# Patient Record
Sex: FEMALE | Race: WHITE | NOT HISPANIC OR LATINO | Employment: OTHER | URBAN - NONMETROPOLITAN AREA
[De-identification: names, ages, dates, MRNs, and addresses within clinical notes are randomized per-mention and may not be internally consistent; named-entity substitution may affect disease eponyms.]

---

## 2023-06-19 PROBLEM — E04.2 MULTINODULAR GOITER: Status: ACTIVE | Noted: 2023-06-19

## 2023-06-19 PROBLEM — G93.5 CHIARI MALFORMATION TYPE I (MULTI): Status: ACTIVE | Noted: 2023-06-19

## 2023-06-19 PROBLEM — K21.9 GERD (GASTROESOPHAGEAL REFLUX DISEASE): Status: ACTIVE | Noted: 2023-06-19

## 2023-06-19 PROBLEM — F41.8 DEPRESSION WITH ANXIETY: Status: ACTIVE | Noted: 2023-06-19

## 2023-06-19 PROBLEM — M85.80 OSTEOPENIA: Status: ACTIVE | Noted: 2023-06-19

## 2023-06-19 NOTE — PROGRESS NOTES
Subjective   Patient ID:   Melody Georges is a 52 y.o. female who presents for No chief complaint on file..  HPI  GERD:  Taking Protonix.    Depression/anxiety:  Taking Lexapro.  Had been on Celexa and Wellbutrin in the past.  Denies SI/HI.  Seeing therapy.    Osteopenia:  Taking Calcium and Vitamin D.    Thyroid nodules/Thyroid goiter:  Seeing endocrine.    Chiari malformation type I:  Asymptomatic.  This is stable.    Health maintenance:  Smoking: Never a smoker.  Mammogram (40-75): July 2022. DUE soon  Labs: Jan 2023.  Colonoscopy (50-75): May 2016  Influenza:     Review of Systems  12 point review of systems negative unless stated above in HPI    There were no vitals filed for this visit.    Physical Exam  General: Alert and oriented, well nourished, no acute distress.  Lungs: Clear to auscultation, non-labored respiration.  Heart: Normal rate, regular rhythm, no murmur, gallop or edema.  Neurologic: Awake, alert, and oriented X3, CN II-XII intact.  Psychiatric: Cooperative, appropriate mood and affect.    Assessment/Plan   Diagnoses and all orders for this visit:  Chiari malformation type I (CMS/HCC)  Depression with anxiety  Gastroesophageal reflux disease without esophagitis  Multinodular goiter  Osteopenia, unspecified location  Visit for screening mammogram

## 2023-06-27 ENCOUNTER — APPOINTMENT (OUTPATIENT)
Dept: PRIMARY CARE | Facility: CLINIC | Age: 53
End: 2023-06-27
Payer: MEDICAID

## 2023-08-04 NOTE — PROGRESS NOTES
Subjective   Patient ID:   Melody Georges is a 53 y.o. female who presents for No chief complaint on file..  HPI  GERD:  Taking Protonix.    Depression/anxiety:  Taking Lexapro.  Had been on Celexa and Wellbutrin in the past.  Denies SI/HI.  Seeing therapy.    Osteopenia:  Taking Calcium and Vitamin D.    Thyroid nodules/Thyroid goiter:  Seeing endocrine.    Chiari malformation type I:  Asymptomatic.  This is stable.    Health maintenance:  Smoking: Never a smoker.  Mammogram (40-75): July 2022. DUE  Labs: Jan 2023.  Colonoscopy (50-75): May 2016  Influenza:     Review of Systems  12 point review of systems negative unless stated above in HPI    There were no vitals filed for this visit.    Physical Exam  General: Alert and oriented, well nourished, no acute distress.  Lungs: Clear to auscultation, non-labored respiration.  Heart: Normal rate, regular rhythm, no murmur, gallop or edema.  Neurologic: Awake, alert, and oriented X3, CN II-XII intact.  Psychiatric: Cooperative, appropriate mood and affect.    Assessment/Plan   Diagnoses and all orders for this visit:  Routine general medical examination at a health care facility  Visit for screening mammogram  Chiari malformation type I (CMS/HCC)  Depression with anxiety  Gastroesophageal reflux disease without esophagitis  Multinodular goiter  Osteopenia, unspecified location

## 2023-08-10 ENCOUNTER — APPOINTMENT (OUTPATIENT)
Dept: PRIMARY CARE | Facility: CLINIC | Age: 53
End: 2023-08-10
Payer: MEDICAID

## 2023-08-14 ENCOUNTER — APPOINTMENT (OUTPATIENT)
Dept: PRIMARY CARE | Facility: CLINIC | Age: 53
End: 2023-08-14
Payer: MEDICAID

## 2023-08-23 ENCOUNTER — OFFICE VISIT (OUTPATIENT)
Dept: PRIMARY CARE | Facility: CLINIC | Age: 53
End: 2023-08-23
Payer: MEDICAID

## 2023-08-23 ENCOUNTER — LAB (OUTPATIENT)
Dept: LAB | Facility: LAB | Age: 53
End: 2023-08-23
Payer: MEDICAID

## 2023-08-23 VITALS
SYSTOLIC BLOOD PRESSURE: 106 MMHG | BODY MASS INDEX: 30.83 KG/M2 | DIASTOLIC BLOOD PRESSURE: 66 MMHG | OXYGEN SATURATION: 96 % | RESPIRATION RATE: 18 BRPM | WEIGHT: 180.6 LBS | HEIGHT: 64 IN | HEART RATE: 61 BPM

## 2023-08-23 DIAGNOSIS — Z12.31 ENCOUNTER FOR SCREENING MAMMOGRAM FOR MALIGNANT NEOPLASM OF BREAST: ICD-10-CM

## 2023-08-23 DIAGNOSIS — M85.80 OSTEOPENIA, UNSPECIFIED LOCATION: ICD-10-CM

## 2023-08-23 DIAGNOSIS — R63.5 WEIGHT GAIN: ICD-10-CM

## 2023-08-23 DIAGNOSIS — I83.93 VARICOSE VEINS OF BOTH LOWER EXTREMITIES, UNSPECIFIED WHETHER COMPLICATED: ICD-10-CM

## 2023-08-23 DIAGNOSIS — F32.A DEPRESSION, UNSPECIFIED DEPRESSION TYPE: ICD-10-CM

## 2023-08-23 DIAGNOSIS — Z12.11 COLON CANCER SCREENING: ICD-10-CM

## 2023-08-23 DIAGNOSIS — R53.83 OTHER FATIGUE: ICD-10-CM

## 2023-08-23 DIAGNOSIS — K21.9 GASTROESOPHAGEAL REFLUX DISEASE WITHOUT ESOPHAGITIS: ICD-10-CM

## 2023-08-23 PROBLEM — K59.00 CONSTIPATION: Status: ACTIVE | Noted: 2023-08-23

## 2023-08-23 PROBLEM — U07.1 COVID-19: Status: RESOLVED | Noted: 2023-08-23 | Resolved: 2023-08-23

## 2023-08-23 PROBLEM — E78.5 HYPERLIPIDEMIA: Status: ACTIVE | Noted: 2023-08-23

## 2023-08-23 PROBLEM — L30.9 DERMATITIS: Status: RESOLVED | Noted: 2023-08-23 | Resolved: 2023-08-23

## 2023-08-23 PROBLEM — J20.9 ACUTE BRONCHITIS: Status: RESOLVED | Noted: 2023-08-23 | Resolved: 2023-08-23

## 2023-08-23 PROBLEM — E66.9 OBESITY: Status: ACTIVE | Noted: 2023-08-23

## 2023-08-23 PROBLEM — J30.9 ALLERGIC RHINITIS: Status: RESOLVED | Noted: 2023-08-23 | Resolved: 2023-08-23

## 2023-08-23 PROBLEM — J01.00 ACUTE MAXILLARY SINUSITIS: Status: RESOLVED | Noted: 2023-08-23 | Resolved: 2023-08-23

## 2023-08-23 PROBLEM — J06.9 ACUTE URI: Status: RESOLVED | Noted: 2023-08-23 | Resolved: 2023-08-23

## 2023-08-23 PROBLEM — D64.9 LOW HEMOGLOBIN: Status: ACTIVE | Noted: 2023-08-23

## 2023-08-23 PROBLEM — D50.9 IRON DEFICIENCY ANEMIA: Status: ACTIVE | Noted: 2023-08-23

## 2023-08-23 PROBLEM — R19.5 OCCULT BLOOD POSITIVE STOOL: Status: RESOLVED | Noted: 2023-08-23 | Resolved: 2023-08-23

## 2023-08-23 PROBLEM — R92.8 ABNORMAL MAMMOGRAM: Status: ACTIVE | Noted: 2023-08-23

## 2023-08-23 PROBLEM — R05.9 COUGH: Status: RESOLVED | Noted: 2023-08-23 | Resolved: 2023-08-23

## 2023-08-23 PROBLEM — R06.89 HYPERCARBIA: Status: ACTIVE | Noted: 2023-08-23

## 2023-08-23 PROBLEM — L73.9 FOLLICULITIS: Status: RESOLVED | Noted: 2023-08-23 | Resolved: 2023-08-23

## 2023-08-23 PROBLEM — N63.0 MASS OF BREAST: Status: ACTIVE | Noted: 2023-08-23

## 2023-08-23 PROBLEM — J02.9 ACUTE VIRAL PHARYNGITIS: Status: RESOLVED | Noted: 2023-08-23 | Resolved: 2023-08-23

## 2023-08-23 PROBLEM — J01.20 ACUTE ETHMOIDAL SINUSITIS: Status: RESOLVED | Noted: 2023-08-23 | Resolved: 2023-08-23

## 2023-08-23 PROBLEM — J02.9 SORE THROAT: Status: RESOLVED | Noted: 2023-08-23 | Resolved: 2023-08-23

## 2023-08-23 PROBLEM — E04.9 THYROID GOITER: Status: ACTIVE | Noted: 2023-08-23

## 2023-08-23 PROBLEM — F32.9 MAJOR DEPRESSIVE DISORDER, SINGLE EPISODE, UNSPECIFIED: Status: ACTIVE | Noted: 2021-05-26

## 2023-08-23 PROBLEM — I88.9 SUBMANDIBULAR LYMPHADENITIS: Status: RESOLVED | Noted: 2023-08-23 | Resolved: 2023-08-23

## 2023-08-23 PROBLEM — M25.539 PAIN, WRIST JOINT: Status: RESOLVED | Noted: 2023-08-23 | Resolved: 2023-08-23

## 2023-08-23 PROBLEM — E67.3 HIGH VITAMIN D LEVEL: Status: ACTIVE | Noted: 2023-08-23

## 2023-08-23 LAB
CHOLESTEROL (MG/DL) IN SER/PLAS: 223 MG/DL (ref 0–199)
CHOLESTEROL IN HDL (MG/DL) IN SER/PLAS: 73.1 MG/DL
CHOLESTEROL/HDL RATIO: 3.1
FERRITIN (UG/LL) IN SER/PLAS: 188 UG/L (ref 8–150)
IRON (UG/DL) IN SER/PLAS: 159 UG/DL (ref 35–150)
IRON BINDING CAPACITY (UG/DL) IN SER/PLAS: 320 UG/DL (ref 240–445)
IRON SATURATION (%) IN SER/PLAS: 50 % (ref 25–45)
LDL: 131 MG/DL (ref 0–99)
TRIGLYCERIDE (MG/DL) IN SER/PLAS: 97 MG/DL (ref 0–149)
VLDL: 19 MG/DL (ref 0–40)

## 2023-08-23 PROCEDURE — 83540 ASSAY OF IRON: CPT

## 2023-08-23 PROCEDURE — 82728 ASSAY OF FERRITIN: CPT

## 2023-08-23 PROCEDURE — 1036F TOBACCO NON-USER: CPT | Performed by: INTERNAL MEDICINE

## 2023-08-23 PROCEDURE — 80061 LIPID PANEL: CPT

## 2023-08-23 PROCEDURE — 83550 IRON BINDING TEST: CPT

## 2023-08-23 PROCEDURE — 99214 OFFICE O/P EST MOD 30 MIN: CPT | Performed by: INTERNAL MEDICINE

## 2023-08-23 PROCEDURE — 36415 COLL VENOUS BLD VENIPUNCTURE: CPT

## 2023-08-23 RX ORDER — ESTRADIOL 1 MG/1
1 TABLET ORAL DAILY
COMMUNITY

## 2023-08-23 RX ORDER — DEXLANSOPRAZOLE 60 MG/1
1 CAPSULE, DELAYED RELEASE ORAL DAILY
COMMUNITY
End: 2023-10-12 | Stop reason: SDUPTHER

## 2023-08-23 RX ORDER — LORAZEPAM 0.5 MG/1
TABLET ORAL AS NEEDED
COMMUNITY
Start: 2023-03-07 | End: 2024-01-16 | Stop reason: SDUPTHER

## 2023-08-23 RX ORDER — DOCUSATE SODIUM 100 MG/1
1 CAPSULE, LIQUID FILLED ORAL DAILY
COMMUNITY
Start: 2017-01-20

## 2023-08-23 RX ORDER — BUPROPION HYDROCHLORIDE 150 MG/1
150 TABLET ORAL EVERY MORNING
Qty: 30 TABLET | Refills: 1 | Status: SHIPPED | OUTPATIENT
Start: 2023-08-23 | End: 2023-09-29 | Stop reason: SDUPTHER

## 2023-08-23 RX ORDER — ESCITALOPRAM OXALATE 10 MG/1
10 TABLET ORAL DAILY
COMMUNITY
End: 2023-09-29 | Stop reason: ALTCHOICE

## 2023-08-23 RX ORDER — CALCIUM CARBONATE 500(1250)
TABLET ORAL
COMMUNITY

## 2023-08-23 ASSESSMENT — ENCOUNTER SYMPTOMS: APPETITE CHANGE: 1

## 2023-08-23 ASSESSMENT — PAIN SCALES - GENERAL: PAINLEVEL: 0-NO PAIN

## 2023-08-23 NOTE — PATIENT INSTRUCTIONS
It was great to see you in the office today! Here is what we discussed at your visit today:  We ordered a dexa scan to check for osteoporosis. Please get this completed as soon as you are able. We will call you with results.  I have ordered you a mammogram to be done as soon as you are able.  We will call the results.  I have placed a referral for you to have a colonoscopy done as soon as you are able to.  We will send in prescription for Wellbutrin XL 150mg daily   Continue to take your other medications including Lexapro  Call office after 3 weeks to let us know how you are tolerating new medication and we will discuss weaning off Lexapro  Please get bloodwork drawn as soon as you are able. We will call you with results.  Follow up in four months

## 2023-08-23 NOTE — PROGRESS NOTES
SUBJECTIVE: She states that she would like to have her cholesterol checked, has not been eating very healthy, recently put on ten pounds.   She states that she was told Lexapro can cause weight gain, and she tried to stop the med, but she was very weepy. She states that she feels constantly hungry on Lexapro. She states that she was previously on Celexa. She states that she likes the way she feels on Lexapro. She states she tried Wellbutrin in the past and it made her nauseated, she tried it for two weeks.     EMILIA Georges is a 53 y.o. female with PMH remarkable for GERD, Depression/Anxiety, Osteopenia, thyroid nodules who presents to the office today for a check up. She was previously seen in office on 01/31/23 for check up per CHINYERE Abel.     HEALTH MAINTENANCE: FOLLOW UP  Smoking: never smoker  Mammogram (40-75): 7/7/22-DUE  Pap smear (21-65): per OB/GYN  Labs: 1/31/23  Colonoscopy (45-75): 5/23/16  Lung cancer screening (55-80 + 30 pack year + smoking/quit in last 15 years): n/a  DEXA (65+, q 2 years): 10/03/18 revealed osteopenia-->DUE    SOCIAL HISTORY:  Social History     Socioeconomic History    Marital status:      Spouse name: Not on file    Number of children: Not on file    Years of education: Not on file    Highest education level: Not on file   Occupational History    Not on file   Tobacco Use    Smoking status: Former     Types: Cigarettes    Smokeless tobacco: Never   Substance and Sexual Activity    Alcohol use: Not Currently    Drug use: Not on file    Sexual activity: Not on file   Other Topics Concern    Not on file   Social History Narrative    Not on file     Social Determinants of Health     Financial Resource Strain: Not on file   Food Insecurity: Not on file   Transportation Needs: Not on file   Physical Activity: Not on file   Stress: Not on file   Social Connections: Not on file   Intimate Partner Violence: Not on file   Housing Stability: Not on file  "    IMMUNIZATIONS:  Immunization History   Administered Date(s) Administered    Pfizer Purple Cap SARS-CoV-2 08/11/2021, 09/03/2021     REVIEW OF SYSTEMS:  Review of Systems   Constitutional:  Positive for appetite change.        Increased appetite with weight gain   Psychiatric/Behavioral:          + anxiety/depression     ALLERGIES:  Allergies   Allergen Reactions    Bupropion Hcl Other    Morphine Hives    Penicillins Hives    Sulfa (Sulfonamide Antibiotics) Hives     VITAL SIGNS:  Vitals:    08/23/23 0914   BP: 106/66   Pulse: 61   Resp: 18   SpO2: 96%   Weight: 81.9 kg (180 lb 9.6 oz)   Height: 1.626 m (5' 4\")     PHYSICAL EXAM:  Physical Exam  Vitals reviewed.   Constitutional:       Appearance: Normal appearance.   HENT:      Head: Normocephalic and atraumatic.   Eyes:      Extraocular Movements: Extraocular movements intact.      Pupils: Pupils are equal, round, and reactive to light.   Cardiovascular:      Rate and Rhythm: Normal rate and regular rhythm.      Pulses: Normal pulses.      Heart sounds: Normal heart sounds.   Pulmonary:      Effort: Pulmonary effort is normal.      Breath sounds: Normal breath sounds.   Abdominal:      General: Bowel sounds are normal.      Palpations: Abdomen is soft.      Hernia: No hernia is present.   Musculoskeletal:         General: Normal range of motion.      Cervical back: Normal range of motion and neck supple.   Skin:     General: Skin is warm and dry.   Neurological:      General: No focal deficit present.      Mental Status: She is alert and oriented to person, place, and time.   Psychiatric:         Mood and Affect: Mood normal.         Behavior: Behavior normal.       MEDICATIONS:  Current Outpatient Medications on File Prior to Visit   Medication Sig    calcium carbonate (Oscal) 500 mg calcium (1,250 mg) tablet as directed Orally chewable    Dexilant 60 mg DR capsule Take 1 capsule (60 mg) by mouth once daily.    docusate sodium (Colace) 100 mg capsule Take 1 " capsule (100 mg) by mouth once daily.    escitalopram (Lexapro) 10 mg tablet Take 1 tablet (10 mg) by mouth once daily.    estradiol (Estrace) 1 mg tablet Take 1 tablet (1 mg) by mouth once daily.    LORazepam (Ativan) 0.5 mg tablet if needed.     No current facility-administered medications on file prior to visit.     LABORATORY DATA:  Lab Results   Component Value Date    WBC 6.7 01/31/2023    HGB 12.7 01/31/2023    HCT 40.4 01/31/2023     01/31/2023    CHOL 199 01/31/2023    TRIG 127 01/31/2023    HDL 64.0 01/31/2023    ALT 15 01/31/2023    AST 17 01/31/2023     01/31/2023    K 4.0 01/31/2023     01/31/2023    CREATININE 0.67 01/31/2023    BUN 13 01/31/2023    CO2 29 01/31/2023    TSH 1.26 01/31/2023     ASSESSMENT AND PLAN:  Health Maintenance: check up  - reviewed most recent bloodwork from 01/31/23  - most recent mammogram on 7/7/23-->DUE, order inputted  - dexa scan from 10/03/18 revealed osteopenia, will repeat test, order inputted  - she states she has been eating a lot, unhealthy choices, has had weight gain, read that it could be caused from Lexapro  - she is concerned about her cholesterol due to weight gain and poor diet  - will check lipid panel, iron panel and ferritin, orders inputted  - she is due for colonoscopy, order inputted    Depression/Anxiety  - she states that she has gained weight, has been overeating  - she reports she read that Lexapro can cause increased appetite and weight gain and tried to wean herself off, but became very tearful and anxious so she resumed full dose  - discussed at length different antidepressant medications and associated side effects with patient  - she states she tried Wellbutrin in the past and it caused nausea, only took it for two weeks  - she is agreeable to try this medication again  - will start Wellbutrin XL 150mg daily, advised to call office after three weeks to inform us if she is able to tolerate medication  - if she is able to  tolerate Wellbutrin, will consider weaning off Lexapro in the future  Thyroid nodules    GERD  - stable  - continue with PPI    Osteopenia  - dexa scan in 2018 revealed osteopenia  - will repeat test, order inputted  - continue with Calcium and vitamin D supplement    Varicose veins  - she reports significant improvement since having procedure per vein specialist  -no edema noted    --------------------  Written by Maria Elena Tracy LPN, acting as a scribe for Dr. Archer. This note accurately reflects the work and decisions made by Dr. Archer.     I, Dr. Archer, attest all medical record entries made by the scribe were under my direction and were personally dictated by me. I have reviewed the chart and agree that the record accurately reflects my performance of the history, physical exam, and assessment and plan.

## 2023-09-11 ENCOUNTER — TELEPHONE (OUTPATIENT)
Dept: PRIMARY CARE | Facility: CLINIC | Age: 53
End: 2023-09-11
Payer: MEDICAID

## 2023-09-12 ENCOUNTER — TELEPHONE (OUTPATIENT)
Dept: PRIMARY CARE | Facility: CLINIC | Age: 53
End: 2023-09-12
Payer: MEDICAID

## 2023-09-12 NOTE — TELEPHONE ENCOUNTER
Pt would like to stop lexapro   Discussed with Dr. Archer   -decrease lexapro to 5mg daily x 1 week  -one tablet every other day x 1 week   -then discontinue    Pt informed

## 2023-09-29 DIAGNOSIS — F32.A DEPRESSION, UNSPECIFIED DEPRESSION TYPE: ICD-10-CM

## 2023-09-29 RX ORDER — BUPROPION HYDROCHLORIDE 150 MG/1
150 TABLET ORAL EVERY MORNING
Qty: 90 TABLET | Refills: 0 | Status: SHIPPED | OUTPATIENT
Start: 2023-09-29 | End: 2023-10-16 | Stop reason: DRUGHIGH

## 2023-10-12 DIAGNOSIS — K21.9 GASTROESOPHAGEAL REFLUX DISEASE WITHOUT ESOPHAGITIS: Primary | ICD-10-CM

## 2023-10-13 RX ORDER — DEXLANSOPRAZOLE 60 MG/1
1 CAPSULE, DELAYED RELEASE ORAL DAILY
Qty: 90 CAPSULE | Refills: 1 | Status: SHIPPED | OUTPATIENT
Start: 2023-10-13 | End: 2024-01-16 | Stop reason: SDUPTHER

## 2023-10-16 DIAGNOSIS — F32.A DEPRESSION, UNSPECIFIED DEPRESSION TYPE: ICD-10-CM

## 2023-10-16 RX ORDER — BUPROPION HYDROCHLORIDE 100 MG/1
100 TABLET ORAL 2 TIMES DAILY
COMMUNITY
End: 2023-10-17 | Stop reason: SDUPTHER

## 2023-10-16 RX ORDER — BUPROPION HYDROCHLORIDE 100 MG/1
100 TABLET ORAL 2 TIMES DAILY
Qty: 60 TABLET | Refills: 0 | Status: CANCELLED | OUTPATIENT
Start: 2023-10-16

## 2023-10-16 RX ORDER — ESCITALOPRAM OXALATE 5 MG/1
5 TABLET ORAL DAILY
Qty: 30 TABLET | Refills: 2 | Status: SHIPPED | OUTPATIENT
Start: 2023-10-16 | End: 2024-03-13 | Stop reason: SDUPTHER

## 2023-10-16 NOTE — TELEPHONE ENCOUNTER
Pt stated lexapro helped with anxiety and depression but caused her to overeat, wellbutrin not helping and celexa has not helped either in the past  Pt stated she restarted the lexapro 5mg due to issues over the weekend, wants your opinion on if she should restart lexapro for good, stop wellbutrin or try a different medication all together ?

## 2023-10-17 DIAGNOSIS — F32.A DEPRESSION, UNSPECIFIED DEPRESSION TYPE: ICD-10-CM

## 2023-10-17 RX ORDER — BUPROPION HYDROCHLORIDE 150 MG/1
150 TABLET ORAL
Qty: 30 TABLET | Refills: 1 | Status: CANCELLED | OUTPATIENT
Start: 2023-10-17

## 2023-10-17 RX ORDER — BUPROPION HYDROCHLORIDE 100 MG/1
100 TABLET ORAL 2 TIMES DAILY
Qty: 60 TABLET | Refills: 0 | Status: SHIPPED | OUTPATIENT
Start: 2023-10-17

## 2023-11-06 ENCOUNTER — APPOINTMENT (OUTPATIENT)
Dept: PREADMISSION TESTING | Facility: HOSPITAL | Age: 53
End: 2023-11-06
Payer: MEDICAID

## 2023-12-12 ENCOUNTER — APPOINTMENT (OUTPATIENT)
Dept: PRIMARY CARE | Facility: CLINIC | Age: 53
End: 2023-12-12
Payer: MEDICAID

## 2024-01-03 ENCOUNTER — APPOINTMENT (OUTPATIENT)
Dept: PRIMARY CARE | Facility: CLINIC | Age: 54
End: 2024-01-03
Payer: MEDICAID

## 2024-01-16 ENCOUNTER — OFFICE VISIT (OUTPATIENT)
Dept: PRIMARY CARE | Facility: CLINIC | Age: 54
End: 2024-01-16
Payer: MEDICAID

## 2024-01-16 ENCOUNTER — LAB (OUTPATIENT)
Dept: LAB | Facility: LAB | Age: 54
End: 2024-01-16
Payer: MEDICAID

## 2024-01-16 VITALS
TEMPERATURE: 97.7 F | HEIGHT: 64 IN | HEART RATE: 60 BPM | SYSTOLIC BLOOD PRESSURE: 118 MMHG | BODY MASS INDEX: 31.51 KG/M2 | DIASTOLIC BLOOD PRESSURE: 60 MMHG | WEIGHT: 184.6 LBS | OXYGEN SATURATION: 97 %

## 2024-01-16 DIAGNOSIS — E78.2 MIXED HYPERLIPIDEMIA: ICD-10-CM

## 2024-01-16 DIAGNOSIS — E66.09 CLASS 1 OBESITY DUE TO EXCESS CALORIES WITH SERIOUS COMORBIDITY AND BODY MASS INDEX (BMI) OF 31.0 TO 31.9 IN ADULT: ICD-10-CM

## 2024-01-16 DIAGNOSIS — F41.9 ANXIETY: Primary | ICD-10-CM

## 2024-01-16 DIAGNOSIS — K21.9 GASTROESOPHAGEAL REFLUX DISEASE WITHOUT ESOPHAGITIS: ICD-10-CM

## 2024-01-16 LAB
ALBUMIN SERPL BCP-MCNC: 4 G/DL (ref 3.4–5)
ALP SERPL-CCNC: 61 U/L (ref 33–110)
ALT SERPL W P-5'-P-CCNC: 13 U/L (ref 7–45)
ANION GAP SERPL CALC-SCNC: 11 MMOL/L (ref 10–20)
AST SERPL W P-5'-P-CCNC: 16 U/L (ref 9–39)
BILIRUB SERPL-MCNC: 0.7 MG/DL (ref 0–1.2)
BUN SERPL-MCNC: 17 MG/DL (ref 6–23)
CALCIUM SERPL-MCNC: 9.5 MG/DL (ref 8.6–10.3)
CHLORIDE SERPL-SCNC: 103 MMOL/L (ref 98–107)
CHOLEST SERPL-MCNC: 202 MG/DL (ref 0–199)
CHOLESTEROL/HDL RATIO: 2.9
CO2 SERPL-SCNC: 30 MMOL/L (ref 21–32)
CREAT SERPL-MCNC: 0.74 MG/DL (ref 0.5–1.05)
EGFRCR SERPLBLD CKD-EPI 2021: >90 ML/MIN/1.73M*2
GLUCOSE SERPL-MCNC: 91 MG/DL (ref 74–99)
HDLC SERPL-MCNC: 70.8 MG/DL
LDLC SERPL CALC-MCNC: 112 MG/DL
NON HDL CHOLESTEROL: 131 MG/DL (ref 0–149)
POTASSIUM SERPL-SCNC: 4.3 MMOL/L (ref 3.5–5.3)
PROT SERPL-MCNC: 6.5 G/DL (ref 6.4–8.2)
SODIUM SERPL-SCNC: 140 MMOL/L (ref 136–145)
TRIGL SERPL-MCNC: 97 MG/DL (ref 0–149)
VLDL: 19 MG/DL (ref 0–40)

## 2024-01-16 PROCEDURE — 99214 OFFICE O/P EST MOD 30 MIN: CPT | Performed by: FAMILY MEDICINE

## 2024-01-16 PROCEDURE — 3008F BODY MASS INDEX DOCD: CPT | Performed by: FAMILY MEDICINE

## 2024-01-16 PROCEDURE — 1036F TOBACCO NON-USER: CPT | Performed by: FAMILY MEDICINE

## 2024-01-16 PROCEDURE — 36415 COLL VENOUS BLD VENIPUNCTURE: CPT

## 2024-01-16 RX ORDER — IBUPROFEN 100 MG/5ML
1000 SUSPENSION, ORAL (FINAL DOSE FORM) ORAL DAILY
COMMUNITY

## 2024-01-16 RX ORDER — LORAZEPAM 0.5 MG/1
0.5 TABLET ORAL DAILY PRN
Qty: 30 TABLET | Refills: 1 | Status: SHIPPED | OUTPATIENT
Start: 2024-01-16

## 2024-01-16 RX ORDER — DEXLANSOPRAZOLE 60 MG/1
1 CAPSULE, DELAYED RELEASE ORAL DAILY PRN
Qty: 90 CAPSULE | Refills: 1 | Status: SHIPPED | OUTPATIENT
Start: 2024-01-16 | End: 2024-02-12 | Stop reason: SDUPTHER

## 2024-01-16 RX ORDER — ONDANSETRON 4 MG/1
4 TABLET, ORALLY DISINTEGRATING ORAL 3 TIMES DAILY
COMMUNITY
Start: 2023-10-18

## 2024-01-16 RX ORDER — CA/D3/MAG OX/ZINC/COP/MANG/BOR 600 MG-800
1 TABLET,CHEWABLE ORAL DAILY
COMMUNITY
Start: 2014-04-10

## 2024-01-16 ASSESSMENT — ENCOUNTER SYMPTOMS
DIZZINESS: 0
NAUSEA: 0
FEVER: 0
ENDOCRINE NEGATIVE: 1
DIARRHEA: 0
SHORTNESS OF BREATH: 0
DIFFICULTY URINATING: 0

## 2024-01-16 ASSESSMENT — PAIN SCALES - GENERAL: PAINLEVEL: 5

## 2024-01-16 ASSESSMENT — PATIENT HEALTH QUESTIONNAIRE - PHQ9
SUM OF ALL RESPONSES TO PHQ9 QUESTIONS 1 AND 2: 0
1. LITTLE INTEREST OR PLEASURE IN DOING THINGS: NOT AT ALL
2. FEELING DOWN, DEPRESSED OR HOPELESS: NOT AT ALL

## 2024-01-16 NOTE — PROGRESS NOTES
"Subjective   Patient ID: Melody Georges is a 53 y.o. female who presents for Obesity (Discuss starting adipex) and Hyperlipidemia (Discuss lab order for lipids).    Obesity,weight gain  GERD-heartburn  High Cholesterol  Chronic anxiety       Review of Systems   Constitutional:  Negative for fever.        Also see HPI   Eyes:  Negative for visual disturbance.   Respiratory:  Negative for shortness of breath.    Cardiovascular:  Negative for chest pain.   Gastrointestinal:  Negative for diarrhea and nausea.   Endocrine: Negative.    Genitourinary:  Negative for difficulty urinating.   Skin:  Negative for rash.   Neurological:  Negative for dizziness.        No focal deficits   Psychiatric/Behavioral:  Negative for suicidal ideas.    All other systems reviewed and are negative.      Objective   /60   Pulse 60   Temp 36.5 °C (97.7 °F)   Ht 1.626 m (5' 4\")   Wt 83.7 kg (184 lb 9.6 oz)   LMP  (LMP Unknown)   SpO2 97%   BMI 31.69 kg/m²     Physical Exam  Vitals and nursing note reviewed.   Constitutional:       Appearance: Normal appearance.   HENT:      Head: Normocephalic and atraumatic.   Eyes:      Extraocular Movements: Extraocular movements intact.      Conjunctiva/sclera: Conjunctivae normal.   Cardiovascular:      Rate and Rhythm: Normal rate and regular rhythm.      Heart sounds: Normal heart sounds.   Pulmonary:      Effort: Pulmonary effort is normal.      Breath sounds: Normal breath sounds.      Comments: Lungs essentially CTA b/l  Abdominal:      General: There is no distension.      Palpations: Abdomen is soft. There is no mass.      Tenderness: There is no abdominal tenderness.   Musculoskeletal:      Right lower leg: No edema.      Left lower leg: No edema.   Skin:     Coloration: Skin is not jaundiced.      Findings: No rash.   Neurological:      General: No focal deficit present.      Mental Status: She is alert and oriented to person, place, and time.   Psychiatric:         Mood and " Affect: Mood normal.         Behavior: Behavior normal.         Thought Content: Thought content normal.         Judgment: Judgment normal.       Assessment/Plan   Diagnoses and all orders for this visit:  Anxiety  -     LORazepam (Ativan) 0.5 mg tablet; Take 1 tablet (0.5 mg) by mouth once daily as needed for anxiety.  Gastroesophageal reflux disease without esophagitis  -     Dexilant 60 mg DR capsule; Take 1 capsule (60 mg) by mouth once daily as needed (Heartburn).  Class 1 obesity due to excess calories with serious comorbidity and body mass index (BMI) of 31.0 to 31.9 in adult  -     phentermine (Adipex-P) 37.5 mg tablet; Take 1 tablet (37.5 mg) by mouth once daily in the morning. Take before meals.  Mixed hyperlipidemia  -     Lipid Panel; Future  -     Comprehensive Metabolic Panel; Future

## 2024-01-17 RX ORDER — PHENTERMINE HYDROCHLORIDE 37.5 MG/1
37.5 TABLET ORAL
Qty: 30 TABLET | Refills: 0 | Status: SHIPPED | OUTPATIENT
Start: 2024-01-17 | End: 2024-02-16 | Stop reason: SDUPTHER

## 2024-01-29 ENCOUNTER — TELEMEDICINE (OUTPATIENT)
Dept: PRIMARY CARE | Facility: CLINIC | Age: 54
End: 2024-01-29
Payer: MEDICAID

## 2024-01-29 DIAGNOSIS — J01.00 ACUTE NON-RECURRENT MAXILLARY SINUSITIS: Primary | ICD-10-CM

## 2024-01-29 PROCEDURE — 99213 OFFICE O/P EST LOW 20 MIN: CPT | Performed by: FAMILY MEDICINE

## 2024-01-29 RX ORDER — AZITHROMYCIN 500 MG/1
500 TABLET, FILM COATED ORAL DAILY
Qty: 5 TABLET | Refills: 0 | Status: SHIPPED | OUTPATIENT
Start: 2024-01-29 | End: 2024-02-03

## 2024-01-29 ASSESSMENT — ENCOUNTER SYMPTOMS
TROUBLE SWALLOWING: 0
SHORTNESS OF BREATH: 0
ABDOMINAL PAIN: 0
STRIDOR: 0
SWOLLEN GLANDS: 0
DIARRHEA: 0
VOMITING: 0
HOARSE VOICE: 1
COUGH: 1
SORE THROAT: 1
HEADACHES: 1
NECK PAIN: 0

## 2024-01-29 NOTE — PROGRESS NOTES
Subjective   Patient ID: Melody Georges is a 53 y.o. female who presents for No chief complaint on file..    Telemedicine visit audio and video    Sore Throat   The maximum temperature recorded prior to her arrival was 100 - 100.9 F. The fever has been present for Less than 1 day. The pain is at a severity of 4/10. Associated symptoms include congestion, coughing, ear pain, headaches, a hoarse voice and a plugged ear sensation. Pertinent negatives include no abdominal pain, diarrhea, drooling, ear discharge, neck pain, shortness of breath, stridor, swollen glands, trouble swallowing or vomiting. She has had no exposure to strep or mono.        Review of Systems   HENT:  Positive for congestion, ear pain, hoarse voice and sore throat. Negative for drooling, ear discharge and trouble swallowing.    Respiratory:  Positive for cough. Negative for shortness of breath and stridor.    Gastrointestinal:  Negative for abdominal pain, diarrhea and vomiting.   Musculoskeletal:  Negative for neck pain.   Neurological:  Positive for headaches.       Objective   LMP  (LMP Unknown)     Physical Exam  Constitutional:       Appearance: Normal appearance.   HENT:      Head: Normocephalic and atraumatic.   Pulmonary:      Effort: Pulmonary effort is normal.   Neurological:      Mental Status: She is alert and oriented to person, place, and time.   Psychiatric:         Mood and Affect: Mood normal.         Behavior: Behavior normal.         Assessment/Plan   Diagnoses and all orders for this visit:  Acute non-recurrent maxillary sinusitis  -     azithromycin (Zithromax) 500 mg tablet; Take 1 tablet (500 mg) by mouth once daily for 5 days.

## 2024-01-30 ENCOUNTER — APPOINTMENT (OUTPATIENT)
Dept: PREADMISSION TESTING | Facility: HOSPITAL | Age: 54
End: 2024-01-30
Payer: MEDICAID

## 2024-02-12 DIAGNOSIS — K21.9 GASTROESOPHAGEAL REFLUX DISEASE WITHOUT ESOPHAGITIS: ICD-10-CM

## 2024-02-12 NOTE — TELEPHONE ENCOUNTER
Refill request patient has switch pharm from [facility] to [facility] and now patient will need letter as why dexilant is preferred med. The is med is no longer on patient formulary patient states she has tried all other meds and this one is the only one that works.

## 2024-02-13 RX ORDER — DEXLANSOPRAZOLE 60 MG/1
1 CAPSULE, DELAYED RELEASE ORAL DAILY PRN
Qty: 90 CAPSULE | Refills: 1 | Status: SHIPPED | OUTPATIENT
Start: 2024-02-13

## 2024-02-16 ENCOUNTER — TELEMEDICINE (OUTPATIENT)
Dept: PRIMARY CARE | Facility: CLINIC | Age: 54
End: 2024-02-16
Payer: MEDICAID

## 2024-02-16 VITALS
BODY MASS INDEX: 31.89 KG/M2 | HEIGHT: 63 IN | DIASTOLIC BLOOD PRESSURE: 62 MMHG | SYSTOLIC BLOOD PRESSURE: 96 MMHG | WEIGHT: 180 LBS | HEART RATE: 65 BPM

## 2024-02-16 DIAGNOSIS — E66.09 CLASS 1 OBESITY DUE TO EXCESS CALORIES WITH SERIOUS COMORBIDITY AND BODY MASS INDEX (BMI) OF 31.0 TO 31.9 IN ADULT: ICD-10-CM

## 2024-02-16 DIAGNOSIS — K21.9 GASTROESOPHAGEAL REFLUX DISEASE WITHOUT ESOPHAGITIS: Primary | ICD-10-CM

## 2024-02-16 PROCEDURE — 3008F BODY MASS INDEX DOCD: CPT | Performed by: FAMILY MEDICINE

## 2024-02-16 PROCEDURE — 99213 OFFICE O/P EST LOW 20 MIN: CPT | Performed by: FAMILY MEDICINE

## 2024-02-16 PROCEDURE — 1036F TOBACCO NON-USER: CPT | Performed by: FAMILY MEDICINE

## 2024-02-16 RX ORDER — PHENTERMINE HYDROCHLORIDE 37.5 MG/1
37.5 TABLET ORAL
Qty: 30 TABLET | Refills: 0 | Status: SHIPPED | OUTPATIENT
Start: 2024-02-16 | End: 2024-03-13 | Stop reason: SDUPTHER

## 2024-02-20 NOTE — PROGRESS NOTES
"Subjective   Patient ID: Melody Georges is a 53 y.o. female who presents for No chief complaint on file..    Telemedicine visit audio and video   gastroesophageal reflux disease without esophagitis  Class 1 obesity due to excess calories         Review of Systems   Constitutional:  Negative for fever.        Also see HPI   Eyes:  Negative for visual disturbance.   Respiratory:  Negative for shortness of breath.    Cardiovascular:  Negative for chest pain.   Gastrointestinal:  Negative for diarrhea and nausea.   Endocrine: Negative.    Genitourinary:  Negative for difficulty urinating.   Skin:  Negative for rash.   Neurological:  Negative for dizziness.        No focal deficits   Psychiatric/Behavioral:  Negative for suicidal ideas.    All other systems reviewed and are negative.      Objective   BP 96/62   Pulse 65   Ht 1.6 m (5' 3\")   Wt 81.6 kg (180 lb)   BMI 31.89 kg/m²     Physical Exam  Constitutional:       Appearance: Normal appearance.   HENT:      Head: Normocephalic and atraumatic.   Eyes:      Extraocular Movements: Extraocular movements intact.   Pulmonary:      Effort: Pulmonary effort is normal.   Neurological:      Mental Status: She is alert and oriented to person, place, and time.   Psychiatric:         Mood and Affect: Mood normal.         Behavior: Behavior normal.         Thought Content: Thought content normal.         Judgment: Judgment normal.         Assessment/Plan   Diagnoses and all orders for this visit:  Gastroesophageal reflux disease without esophagitis  Class 1 obesity due to excess calories with serious comorbidity and body mass index (BMI) of 31.0 to 31.9 in adult  -     phentermine (Adipex-P) 37.5 mg tablet; Take 1 tablet (37.5 mg) by mouth once daily in the morning. Take before meals.         "

## 2024-03-12 ENCOUNTER — TELEPHONE (OUTPATIENT)
Dept: PRIMARY CARE | Facility: CLINIC | Age: 54
End: 2024-03-12
Payer: MEDICAID

## 2024-03-12 DIAGNOSIS — F32.A DEPRESSION, UNSPECIFIED DEPRESSION TYPE: ICD-10-CM

## 2024-03-12 DIAGNOSIS — E66.09 CLASS 1 OBESITY DUE TO EXCESS CALORIES WITH SERIOUS COMORBIDITY AND BODY MASS INDEX (BMI) OF 30.0 TO 30.9 IN ADULT: Primary | ICD-10-CM

## 2024-03-12 DIAGNOSIS — E66.09 CLASS 1 OBESITY DUE TO EXCESS CALORIES WITH SERIOUS COMORBIDITY AND BODY MASS INDEX (BMI) OF 31.0 TO 31.9 IN ADULT: ICD-10-CM

## 2024-03-13 VITALS — WEIGHT: 172 LBS | BODY MASS INDEX: 30.48 KG/M2 | HEIGHT: 63 IN

## 2024-03-13 RX ORDER — PHENTERMINE HYDROCHLORIDE 37.5 MG/1
37.5 TABLET ORAL
Qty: 30 TABLET | Refills: 0 | Status: SHIPPED | OUTPATIENT
Start: 2024-03-13 | End: 2024-04-12

## 2024-03-13 RX ORDER — BUSPIRONE HYDROCHLORIDE 10 MG/1
10 TABLET ORAL 2 TIMES DAILY
Qty: 60 TABLET | Refills: 5 | Status: SHIPPED | OUTPATIENT
Start: 2024-03-13

## 2024-03-13 RX ORDER — ESCITALOPRAM OXALATE 5 MG/1
5 TABLET ORAL DAILY
Qty: 30 TABLET | Refills: 2 | Status: SHIPPED | OUTPATIENT
Start: 2024-03-13 | End: 2024-03-13 | Stop reason: SDUPTHER

## 2024-03-13 RX ORDER — BUSPIRONE HYDROCHLORIDE 10 MG/1
10 TABLET ORAL 2 TIMES DAILY
Qty: 60 TABLET | Refills: 5 | Status: SHIPPED | OUTPATIENT
Start: 2024-03-13 | End: 2024-03-13 | Stop reason: SDUPTHER

## 2024-03-13 RX ORDER — ESCITALOPRAM OXALATE 5 MG/1
5 TABLET ORAL DAILY
Qty: 30 TABLET | Refills: 2 | Status: SHIPPED | OUTPATIENT
Start: 2024-03-13 | End: 2024-06-11

## 2024-03-13 ASSESSMENT — ENCOUNTER SYMPTOMS
ENDOCRINE NEGATIVE: 1
FEVER: 0
DIFFICULTY URINATING: 0
SHORTNESS OF BREATH: 0
DIZZINESS: 0
DIARRHEA: 0
NAUSEA: 0

## 2024-03-13 NOTE — TELEPHONE ENCOUNTER
Patient called back  
Patient called back and the psychiatrist is on vacation the patient will be leaving for [place] in 1 week.   
Patient is currently taking Lexapro 5 mg.  She was evaluated Olga Maxwell for anxiety and was diagnosed with clinical anxiety.  Alissa feels she is on the wrong type of medication.  She is asking if she can try the Buspar.    
Patient will be seeing the psychiatrist and will have the med filled by them  
Rx sent for Lexapro 5 mg and buspirone 10 mg--discussed with pt  
Detail Level: Detailed
Continue Regimen: Metrocream pt has RX at home
Render In Strict Bullet Format?: No

## 2024-04-09 ENCOUNTER — TELEPHONE (OUTPATIENT)
Dept: PRIMARY CARE | Facility: CLINIC | Age: 54
End: 2024-04-09
Payer: MEDICAID

## 2024-04-09 DIAGNOSIS — R53.83 TIRED: Primary | ICD-10-CM

## 2024-04-09 DIAGNOSIS — Z78.0 MENOPAUSE: ICD-10-CM

## 2024-04-09 NOTE — TELEPHONE ENCOUNTER
Patient states they tried to make and appointment to see an endocrinologist, however they will not make appointment until referral is sent  Patient requesting a call once referral is signed   Referral pended for signature

## 2024-05-13 PROBLEM — E67.3 HIGH VITAMIN D LEVEL: Status: RESOLVED | Noted: 2023-08-23 | Resolved: 2024-05-13

## 2024-05-13 PROBLEM — F41.8 DEPRESSION WITH ANXIETY: Status: ACTIVE | Noted: 2021-05-26

## 2024-05-13 PROBLEM — D50.9 IRON DEFICIENCY ANEMIA: Status: RESOLVED | Noted: 2023-08-23 | Resolved: 2024-05-13

## 2024-05-13 PROBLEM — I83.93 VARICOSE VEINS OF BOTH LOWER EXTREMITIES: Status: ACTIVE | Noted: 2024-05-13

## 2024-05-13 PROBLEM — R06.89 HYPERCARBIA: Status: RESOLVED | Noted: 2023-08-23 | Resolved: 2024-05-13

## 2024-05-13 PROBLEM — R63.5 WEIGHT GAIN: Status: ACTIVE | Noted: 2023-08-23

## 2024-05-13 PROBLEM — F41.8 DEPRESSION WITH ANXIETY: Status: ACTIVE | Noted: 2024-05-13

## 2024-05-13 NOTE — PROGRESS NOTES
Subjective   Patient ID: Melody Georges is a 53 y.o. female who presents for No chief complaint on file..    HPI   Pt here to establish; is a transfer from Dr. Baltazar.  She has also seen other  IM and FM providers in the past 2 yrs.      She sees  for her depression/anxiety and gets meds from them.  She will be seeing [facility] for her hormones.      She does have a nodular thyroid.  She has also had issues with her wt.    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      buPROPion (Wellbutrin) 100 mg tablet Take 1 tablet (100 mg) by mouth 2 times a day. (Patient not taking: Reported on 1/16/2024) 60 tablet 0    busPIRone (Buspar) 10 mg tablet Take 1 tablet (10 mg) by mouth 2 times a day. 60 tablet 5    Ca-D3-mag-zinc--sanjuanita-boron (Caltrate 600-D Plus Minerals) 600 mg calcium- 800 unit-40 mg tablet,chewable Chew 1 tablet once daily.      calcium carbonate (Oscal) 500 mg calcium (1,250 mg) tablet as directed Orally chewable      dexlansoprazole (Dexilant) 60 mg DR capsule Take 1 capsule (60 mg) by mouth once daily as needed (Heartburn). 90 capsule 1    docusate sodium (Colace) 100 mg capsule Take 1 capsule (100 mg) by mouth once daily.      escitalopram (Lexapro) 5 mg tablet Take 1 tablet (5 mg) by mouth once daily. 30 tablet 2    estradiol (Estrace) 1 mg tablet Take 1  tablet (1 mg) by mouth once daily.      LORazepam (Ativan) 0.5 mg tablet Take 1 tablet (0.5 mg) by mouth once daily as needed for anxiety. 30 tablet 1    ondansetron ODT (Zofran-ODT) 4 mg disintegrating tablet Take 1 tablet (4 mg) by mouth 3 times a day.      phentermine (Adipex-P) 37.5 mg tablet Take 1 tablet (37.5 mg) by mouth once daily in the morning. Take before meals. 30 tablet 0     No current facility-administered medications for this visit.       Review of Systems    Objective   There were no vitals taken for this visit.    Physical Exam    Assessment/Plan   {Assess/PlanSmartLinks:36133}

## 2024-06-11 ENCOUNTER — TELEPHONE (OUTPATIENT)
Dept: PRIMARY CARE | Facility: CLINIC | Age: 54
End: 2024-06-11
Payer: MEDICAID

## 2024-06-11 ENCOUNTER — APPOINTMENT (OUTPATIENT)
Dept: PRIMARY CARE | Facility: CLINIC | Age: 54
End: 2024-06-11
Payer: MEDICAID

## 2024-07-08 ENCOUNTER — TELEMEDICINE (OUTPATIENT)
Dept: PRIMARY CARE | Facility: CLINIC | Age: 54
End: 2024-07-08
Payer: OTHER GOVERNMENT

## 2024-07-08 DIAGNOSIS — R73.9 HYPERGLYCEMIA: ICD-10-CM

## 2024-07-08 DIAGNOSIS — Z13.89 SCREENING FOR NEPHROPATHY: ICD-10-CM

## 2024-07-08 DIAGNOSIS — R53.83 TIRED: Primary | ICD-10-CM

## 2024-07-08 DIAGNOSIS — Z13.6 SCREENING FOR HEART DISEASE: ICD-10-CM

## 2024-07-08 PROCEDURE — 99213 OFFICE O/P EST LOW 20 MIN: CPT | Performed by: FAMILY MEDICINE

## 2024-07-08 RX ORDER — SERTRALINE HYDROCHLORIDE 25 MG/1
25 TABLET, FILM COATED ORAL DAILY
COMMUNITY
Start: 2024-02-21 | End: 2024-07-08 | Stop reason: ALTCHOICE

## 2024-07-08 RX ORDER — FLUCONAZOLE 150 MG/1
1 TABLET ORAL DAILY
COMMUNITY
Start: 2024-05-02

## 2024-07-08 NOTE — PROGRESS NOTES
Subjective   Patient ID: Melody Georges is a 53 y.o. female who presents for No chief complaint on file..    Telemedicine visit with audio and video   tired  Hyperglycemia         Review of Systems   Constitutional:  Negative for fever.        Also see HPI   Eyes:  Negative for visual disturbance.   Respiratory:  Negative for shortness of breath.    Cardiovascular:  Negative for chest pain.   Gastrointestinal:  Negative for diarrhea and nausea.   Endocrine: Negative.    Genitourinary:  Negative for difficulty urinating.   Skin:  Negative for rash.   Neurological:  Negative for dizziness.        No focal deficits   Psychiatric/Behavioral:  Negative for suicidal ideas.    All other systems reviewed and are negative.      Objective   There were no vitals taken for this visit.    Physical Exam  Constitutional:       Appearance: Normal appearance.   HENT:      Head: Normocephalic and atraumatic.   Eyes:      Conjunctiva/sclera: Conjunctivae normal.   Pulmonary:      Effort: Pulmonary effort is normal.   Neurological:      Mental Status: She is oriented to person, place, and time.   Psychiatric:         Mood and Affect: Mood normal.         Behavior: Behavior normal.         Thought Content: Thought content normal.         Judgment: Judgment normal.         Assessment/Plan   Diagnoses and all orders for this visit:  Tired  -     Comprehensive Metabolic Panel; Future  -     TSH with reflex to Free T4 if abnormal; Future  -     CBC and Auto Differential; Future  -     Vitamin B12; Future  Screening for heart disease  -     Lipid Panel; Future  Hyperglycemia  -     Hemoglobin A1C; Future  Screening for nephropathy  -     Microscopic Only, Urine; Future

## 2024-07-09 ENCOUNTER — APPOINTMENT (OUTPATIENT)
Dept: PRIMARY CARE | Facility: CLINIC | Age: 54
End: 2024-07-09
Payer: MEDICAID

## 2024-07-09 ENCOUNTER — LAB (OUTPATIENT)
Dept: LAB | Facility: LAB | Age: 54
End: 2024-07-09
Payer: OTHER GOVERNMENT

## 2024-07-09 ENCOUNTER — APPOINTMENT (OUTPATIENT)
Dept: PRIMARY CARE | Facility: CLINIC | Age: 54
End: 2024-07-09
Payer: OTHER GOVERNMENT

## 2024-07-09 DIAGNOSIS — R53.83 TIRED: ICD-10-CM

## 2024-07-09 DIAGNOSIS — Z13.89 SCREENING FOR NEPHROPATHY: ICD-10-CM

## 2024-07-09 DIAGNOSIS — Z13.6 SCREENING FOR HEART DISEASE: ICD-10-CM

## 2024-07-09 DIAGNOSIS — R73.9 HYPERGLYCEMIA: ICD-10-CM

## 2024-07-09 LAB
ALBUMIN SERPL BCP-MCNC: 4 G/DL (ref 3.4–5)
ALP SERPL-CCNC: 56 U/L (ref 33–110)
ALT SERPL W P-5'-P-CCNC: 12 U/L (ref 7–45)
ANION GAP SERPL CALC-SCNC: 10 MMOL/L (ref 10–20)
AST SERPL W P-5'-P-CCNC: 17 U/L (ref 9–39)
BACTERIA #/AREA URNS AUTO: ABNORMAL /HPF
BASOPHILS # BLD AUTO: 0.05 X10*3/UL (ref 0–0.1)
BASOPHILS NFR BLD AUTO: 0.9 %
BILIRUB SERPL-MCNC: 0.8 MG/DL (ref 0–1.2)
BUN SERPL-MCNC: 16 MG/DL (ref 6–23)
CALCIUM SERPL-MCNC: 9 MG/DL (ref 8.6–10.3)
CHLORIDE SERPL-SCNC: 104 MMOL/L (ref 98–107)
CHOLEST SERPL-MCNC: 197 MG/DL (ref 0–199)
CHOLESTEROL/HDL RATIO: 3
CO2 SERPL-SCNC: 30 MMOL/L (ref 21–32)
CREAT SERPL-MCNC: 0.78 MG/DL (ref 0.5–1.05)
EGFRCR SERPLBLD CKD-EPI 2021: 90 ML/MIN/1.73M*2
EOSINOPHIL # BLD AUTO: 0.12 X10*3/UL (ref 0–0.7)
EOSINOPHIL NFR BLD AUTO: 2.1 %
ERYTHROCYTE [DISTWIDTH] IN BLOOD BY AUTOMATED COUNT: 12.8 % (ref 11.5–14.5)
EST. AVERAGE GLUCOSE BLD GHB EST-MCNC: 105 MG/DL
GLUCOSE SERPL-MCNC: 101 MG/DL (ref 74–99)
HBA1C MFR BLD: 5.3 %
HCT VFR BLD AUTO: 40.1 % (ref 36–46)
HDLC SERPL-MCNC: 64.6 MG/DL
HGB BLD-MCNC: 13 G/DL (ref 12–16)
IMM GRANULOCYTES # BLD AUTO: 0.02 X10*3/UL (ref 0–0.7)
IMM GRANULOCYTES NFR BLD AUTO: 0.3 % (ref 0–0.9)
LDLC SERPL CALC-MCNC: 109 MG/DL
LYMPHOCYTES # BLD AUTO: 2.43 X10*3/UL (ref 1.2–4.8)
LYMPHOCYTES NFR BLD AUTO: 42.3 %
MCH RBC QN AUTO: 30.1 PG (ref 26–34)
MCHC RBC AUTO-ENTMCNC: 32.4 G/DL (ref 32–36)
MCV RBC AUTO: 93 FL (ref 80–100)
MONOCYTES # BLD AUTO: 0.43 X10*3/UL (ref 0.1–1)
MONOCYTES NFR BLD AUTO: 7.5 %
MUCOUS THREADS #/AREA URNS AUTO: ABNORMAL /LPF
NEUTROPHILS # BLD AUTO: 2.7 X10*3/UL (ref 1.2–7.7)
NEUTROPHILS NFR BLD AUTO: 46.9 %
NON HDL CHOLESTEROL: 132 MG/DL (ref 0–149)
NRBC BLD-RTO: 0 /100 WBCS (ref 0–0)
PLATELET # BLD AUTO: 301 X10*3/UL (ref 150–450)
POTASSIUM SERPL-SCNC: 4.2 MMOL/L (ref 3.5–5.3)
PROT SERPL-MCNC: 6.5 G/DL (ref 6.4–8.2)
RBC # BLD AUTO: 4.32 X10*6/UL (ref 4–5.2)
RBC #/AREA URNS AUTO: ABNORMAL /HPF
SODIUM SERPL-SCNC: 140 MMOL/L (ref 136–145)
SQUAMOUS #/AREA URNS AUTO: ABNORMAL /HPF
TRIGL SERPL-MCNC: 115 MG/DL (ref 0–149)
TSH SERPL-ACNC: 1.53 MIU/L (ref 0.44–3.98)
VIT B12 SERPL-MCNC: 603 PG/ML (ref 211–911)
VLDL: 23 MG/DL (ref 0–40)
WBC # BLD AUTO: 5.8 X10*3/UL (ref 4.4–11.3)
WBC #/AREA URNS AUTO: ABNORMAL /HPF

## 2024-07-09 PROCEDURE — 82607 VITAMIN B-12: CPT

## 2024-07-09 PROCEDURE — 83036 HEMOGLOBIN GLYCOSYLATED A1C: CPT

## 2024-07-09 PROCEDURE — 36415 COLL VENOUS BLD VENIPUNCTURE: CPT

## 2024-07-10 ENCOUNTER — APPOINTMENT (OUTPATIENT)
Dept: ENDOCRINOLOGY | Facility: CLINIC | Age: 54
End: 2024-07-10

## 2024-07-10 VITALS
BODY MASS INDEX: 32.78 KG/M2 | DIASTOLIC BLOOD PRESSURE: 59 MMHG | HEART RATE: 57 BPM | WEIGHT: 185 LBS | SYSTOLIC BLOOD PRESSURE: 104 MMHG | HEIGHT: 63 IN

## 2024-07-10 DIAGNOSIS — E16.1 REACTIVE HYPOGLYCEMIA: Primary | ICD-10-CM

## 2024-07-10 DIAGNOSIS — E66.9 CLASS 1 OBESITY WITH BODY MASS INDEX (BMI) OF 32.0 TO 32.9 IN ADULT, UNSPECIFIED OBESITY TYPE, UNSPECIFIED WHETHER SERIOUS COMORBIDITY PRESENT: ICD-10-CM

## 2024-07-10 PROCEDURE — 1036F TOBACCO NON-USER: CPT | Performed by: NURSE PRACTITIONER

## 2024-07-10 PROCEDURE — 3008F BODY MASS INDEX DOCD: CPT | Performed by: NURSE PRACTITIONER

## 2024-07-10 PROCEDURE — 99202 OFFICE O/P NEW SF 15 MIN: CPT | Performed by: NURSE PRACTITIONER

## 2024-07-10 ASSESSMENT — PAIN SCALES - GENERAL: PAINLEVEL: 0-NO PAIN

## 2024-07-10 ASSESSMENT — PATIENT HEALTH QUESTIONNAIRE - PHQ9
2. FEELING DOWN, DEPRESSED OR HOPELESS: NOT AT ALL
1. LITTLE INTEREST OR PLEASURE IN DOING THINGS: NOT AT ALL
2. FEELING DOWN, DEPRESSED OR HOPELESS: NOT AT ALL
SUM OF ALL RESPONSES TO PHQ9 QUESTIONS 1 & 2: 0

## 2024-07-10 NOTE — LETTER
July 10, 2024     Jodi Otoole MD  [address] [address]    Patient: Melody Georges   YOB: 1970   Date of Visit: 7/10/2024       Dear Dr. Jodi Otoole MD:    Thank you for referring Melody Georges to me for evaluation. Below are my notes for this consultation.  If you have questions, please do not hesitate to call me. I look forward to following your patient along with you.       Sincerely,     Glenroy Zhang, APRN-CNP      CC: No Recipients  ______________________________________________________________________________________    HPI  New patient present for weight discussion/hypoglycemia. 54 yo with difficulty losing weight. Post menopausal. Total hysterectomy in her 40's. Currently taking oral HRT. She has tried Adipex, Wellbutrin and Semaglutide in the past. Did not tolerate due to side effects, made her have brain fog. She also had hypoglycemia with semaglutide. She would prefer her weight to be in the lower 170's. She states that since Covid she has not been mindful of her diet/food choices. She struggles with some life stressors. She does walk daily, was getting massages when stressed.  Previous lab work up in Jan 2024 showing normal hormonal levels. A1c and TSH also normal.      Current Outpatient Medications:   •  ascorbic acid (Vitamin C) 1,000 mg tablet, Take 1 tablet (1,000 mg) by mouth once daily., Disp: , Rfl:   •  Ca-D3-mag-zinc--sanjuanita-boron (Caltrate 600-D Plus Minerals) 600 mg calcium- 800 unit-40 mg tablet,chewable, Chew 1 tablet once daily., Disp: , Rfl:   •  dexlansoprazole (Dexilant) 60 mg DR capsule, Take 1 capsule (60 mg) by mouth once daily as needed (Heartburn)., Disp: 90 capsule, Rfl: 1  •  docusate sodium (Colace) 100 mg capsule, Take 1 capsule (100 mg) by mouth once daily., Disp: , Rfl:   •  estradiol (Estrace) 1 mg tablet, Take 1 tablet (1 mg) by mouth once daily., Disp: , Rfl:   •  fluconazole (Diflucan) 150 mg tablet, Take 1  tablet (150 mg) by mouth once daily., Disp: , Rfl:   •  LORazepam (Ativan) 0.5 mg tablet, Take 1 tablet (0.5 mg) by mouth once daily as needed for anxiety., Disp: 30 tablet, Rfl: 1  •  ondansetron ODT (Zofran-ODT) 4 mg disintegrating tablet, Take 1 tablet (4 mg) by mouth 3 times a day., Disp: , Rfl:   •  calcium carbonate (Oscal) 500 mg calcium (1,250 mg) tablet, as directed Orally chewable, Disp: , Rfl:   •  escitalopram (Lexapro) 5 mg tablet, Take 1 tablet (5 mg) by mouth once daily., Disp: 30 tablet, Rfl: 2      Allergies as of 07/10/2024 - Reviewed 07/10/2024   Allergen Reaction Noted   • Bupropion Dizziness and Unknown 2023   • Morphine Hives 2023   • Penicillins Hives 2023   • Sulfa (sulfonamide antibiotics) Hives 2023   • Shellfish containing products Rash 2009     Past Medical History:   Diagnosis Date   • Acute bronchitis 2023   • Acute URI 2023   • Allergic rhinitis 2023   • Arnold-Chiari syndrome without spina bifida or hydrocephalus (Multi)    • COVID-19 2023   • Depression with anxiety     [facility]   • Dermatitis 2023   • Fatigue 2023   • Folliculitis 2023   • GERD (gastroesophageal reflux disease)    • History of iron deficiency anemia    • Occult blood positive stool 2023   • Thyroid nodule       Past Surgical History:   Procedure Laterality Date   •  SECTION, CLASSIC  2013     Section   • OTHER SURGICAL HISTORY  2013    Salpingo-oophorectomy   • TONSILLECTOMY  2018    Tonsillectomy   • TOTAL ABDOMINAL HYSTERECTOMY  2013    Total Abdominal Hysterectomy      Review of Systems  Cardiology: Lightheadedness-denies.  Chest pain-denies.  Leg edema-denies.  Palpitations-denies.  Respiratory: Cough-denies. Shortness of breath-denies.  Wheezing-denies.  Gastroenterology: Constipation-denies.  Diarrhea-denies.  Heartburn-denies.  Endocrinology: Cold intolerance-denies.  Heat  "intolerance-denies.  Sweats-denies.  Neurology: Headache-denies.  Tremor-denies.  Neuropathy in extremities-denies.  Psychology: Low energy-denies.  Irritability-denies.  Sleep disturbances-denies.      /59 (BP Location: Left arm, Patient Position: Sitting)   Pulse 57   Ht 1.6 m (5' 2.99\")   Wt 83.9 kg (185 lb)   BMI 32.78 kg/m²       Labs:  Lab Results   Component Value Date    WBC 5.8 07/09/2024    NRBC 0.0 07/09/2024    RBC 4.32 07/09/2024    HGB 13.0 07/09/2024    HCT 40.1 07/09/2024     07/09/2024     Lab Results   Component Value Date    CALCIUM 9.0 07/09/2024    AST 17 07/09/2024    ALKPHOS 56 07/09/2024    BILITOT 0.8 07/09/2024    PROT 6.5 07/09/2024    ALBUMIN 4.0 07/09/2024    GLOB 2.5 05/17/2022    AGR 1.6 05/17/2022     07/09/2024    K 4.2 07/09/2024     07/09/2024    CO2 30 07/09/2024    ANIONGAP 10 07/09/2024    BUN 16 07/09/2024    CREATININE 0.78 07/09/2024    UREACREAUR 16.3 05/17/2022    GLUCOSE 101 (H) 07/09/2024    ALT 12 07/09/2024    EGFR 90 07/09/2024     Lab Results   Component Value Date    CHOL 197 07/09/2024    TRIG 115 07/09/2024    HDL 64.6 07/09/2024    LDLCALC 109 (H) 07/09/2024     Lab Results   Component Value Date    TSH 1.53 07/09/2024     Lab Results   Component Value Date    [id number] 603 07/09/2024     Lab Results   Component Value Date    HGBA1C 5.3 07/09/2024         Physical Exam  General Appearance: pleasant, cooperative, no acute distress  HEENT: no chemosis, no proptosis, no lid lag, no lid retraction  Neck: no lymphadenopathy, no thyromegaly, no dominant thyroid nodules  Heart: no murmurs, regular rate and rhythm, S1 and S2  Lungs: no wheezes, no rhonci, no rales  Extremities: no lower extremity swelling      Assessment/Plan  1. Reactive hypoglycemia  -discussed causes of her hypoglycemia, she admits to consuming large carbs at once  -given written education on ways to improve a more steady glycemic control  -offered her to meet with " diabetic educator, she will call back if she decides to do so  -her daughter recently diagnosed with pre-diabetes will be meeting with nutritionist soon     2. Class 1 obesity with body mass index (BMI) of 32.0 to 32.9 in adult, unspecified obesity type, unspecified whether serious comorbidity present  -intolerant Adipex, Wellbutrin  -intolerant GLP1    Follow Up: as needed    -labs/tests/notes reviewed  -reviewed and counseled patient on medication monitoring and side effects

## 2024-07-10 NOTE — PROGRESS NOTES
HPI   New patient present for weight discussion/hypoglycemia. 52 yo with difficulty losing weight. Post menopausal. Total hysterectomy in her 40's. Currently taking oral HRT. She has tried Adipex, Wellbutrin and Semaglutide in the past. Did not tolerate due to side effects, made her have brain fog. She also had hypoglycemia with semaglutide. She would prefer her weight to be in the lower 170's. She states that since Covid she has not been mindful of her diet/food choices. She struggles with some life stressors. She does walk daily, was getting massages when stressed.  Previous lab work up in Jan 2024 showing normal hormonal levels. A1c and TSH also normal.      Current Outpatient Medications:     ascorbic acid (Vitamin C) 1,000 mg tablet, Take 1 tablet (1,000 mg) by mouth once daily., Disp: , Rfl:     Ca-D3-mag-zinc--sanjuanita-boron (Caltrate 600-D Plus Minerals) 600 mg calcium- 800 unit-40 mg tablet,chewable, Chew 1 tablet once daily., Disp: , Rfl:     dexlansoprazole (Dexilant) 60 mg DR capsule, Take 1 capsule (60 mg) by mouth once daily as needed (Heartburn)., Disp: 90 capsule, Rfl: 1    docusate sodium (Colace) 100 mg capsule, Take 1 capsule (100 mg) by mouth once daily., Disp: , Rfl:     estradiol (Estrace) 1 mg tablet, Take 1 tablet (1 mg) by mouth once daily., Disp: , Rfl:     fluconazole (Diflucan) 150 mg tablet, Take 1 tablet (150 mg) by mouth once daily., Disp: , Rfl:     LORazepam (Ativan) 0.5 mg tablet, Take 1 tablet (0.5 mg) by mouth once daily as needed for anxiety., Disp: 30 tablet, Rfl: 1    ondansetron ODT (Zofran-ODT) 4 mg disintegrating tablet, Take 1 tablet (4 mg) by mouth 3 times a day., Disp: , Rfl:     calcium carbonate (Oscal) 500 mg calcium (1,250 mg) tablet, as directed Orally chewable, Disp: , Rfl:     escitalopram (Lexapro) 5 mg tablet, Take 1 tablet (5 mg) by mouth once daily., Disp: 30 tablet, Rfl: 2      Allergies as of 07/10/2024 - Reviewed 07/10/2024   Allergen Reaction Noted     "Bupropion Dizziness and Unknown 2023    Morphine Hives 2023    Penicillins Hives 2023    Sulfa (sulfonamide antibiotics) Hives 2023    Shellfish containing products Rash 2009     Past Medical History:   Diagnosis Date    Acute bronchitis 2023    Acute URI 2023    Allergic rhinitis 2023    Arnold-Chiari syndrome without spina bifida or hydrocephalus (Multi)     COVID-19 2023    Depression with anxiety     [facility]    Dermatitis 2023    Fatigue 2023    Folliculitis 2023    GERD (gastroesophageal reflux disease)     History of iron deficiency anemia     Occult blood positive stool 2023    Thyroid nodule       Past Surgical History:   Procedure Laterality Date     SECTION, CLASSIC  2013     Section    OTHER SURGICAL HISTORY  2013    Salpingo-oophorectomy    TONSILLECTOMY  2018    Tonsillectomy    TOTAL ABDOMINAL HYSTERECTOMY  2013    Total Abdominal Hysterectomy      Review of Systems   Cardiology: Lightheadedness-denies.  Chest pain-denies.  Leg edema-denies.  Palpitations-denies.  Respiratory: Cough-denies. Shortness of breath-denies.  Wheezing-denies.  Gastroenterology: Constipation-denies.  Diarrhea-denies.  Heartburn-denies.  Endocrinology: Cold intolerance-denies.  Heat intolerance-denies.  Sweats-denies.  Neurology: Headache-denies.  Tremor-denies.  Neuropathy in extremities-denies.  Psychology: Low energy-denies.  Irritability-denies.  Sleep disturbances-denies.      /59 (BP Location: Left arm, Patient Position: Sitting)   Pulse 57   Ht 1.6 m (5' 2.99\")   Wt 83.9 kg (185 lb)   BMI 32.78 kg/m²       Labs:  Lab Results   Component Value Date    WBC 5.8 2024    NRBC 0.0 2024    RBC 4.32 2024    HGB 13.0 2024    HCT 40.1 2024     2024     Lab Results   Component Value Date    CALCIUM 9.0 2024    AST 17 2024    ALKPHOS 56 " 07/09/2024    BILITOT 0.8 07/09/2024    PROT 6.5 07/09/2024    ALBUMIN 4.0 07/09/2024    GLOB 2.5 05/17/2022    AGR 1.6 05/17/2022     07/09/2024    K 4.2 07/09/2024     07/09/2024    CO2 30 07/09/2024    ANIONGAP 10 07/09/2024    BUN 16 07/09/2024    CREATININE 0.78 07/09/2024    UREACREAUR 16.3 05/17/2022    GLUCOSE 101 (H) 07/09/2024    ALT 12 07/09/2024    EGFR 90 07/09/2024     Lab Results   Component Value Date    CHOL 197 07/09/2024    TRIG 115 07/09/2024    HDL 64.6 07/09/2024    LDLCALC 109 (H) 07/09/2024     Lab Results   Component Value Date    TSH 1.53 07/09/2024     Lab Results   Component Value Date    [id number] 603 07/09/2024     Lab Results   Component Value Date    HGBA1C 5.3 07/09/2024         Physical Exam   General Appearance: pleasant, cooperative, no acute distress  HEENT: no chemosis, no proptosis, no lid lag, no lid retraction  Neck: no lymphadenopathy, no thyromegaly, no dominant thyroid nodules  Heart: no murmurs, regular rate and rhythm, S1 and S2  Lungs: no wheezes, no rhonci, no rales  Extremities: no lower extremity swelling      Assessment/Plan   1. Reactive hypoglycemia  -discussed causes of her hypoglycemia, she admits to consuming large carbs at once  -given written education on ways to improve a more steady glycemic control  -offered her to meet with diabetic educator, she will call back if she decides to do so  -her daughter recently diagnosed with pre-diabetes will be meeting with nutritionist soon     2. Class 1 obesity with body mass index (BMI) of 32.0 to 32.9 in adult, unspecified obesity type, unspecified whether serious comorbidity present  -intolerant Adipex, Wellbutrin  -intolerant GLP1    Follow Up: as needed    -labs/tests/notes reviewed  -reviewed and counseled patient on medication monitoring and side effects

## 2024-07-12 ENCOUNTER — TELEPHONE (OUTPATIENT)
Dept: PRIMARY CARE | Facility: CLINIC | Age: 54
End: 2024-07-12

## 2024-07-12 ENCOUNTER — APPOINTMENT (OUTPATIENT)
Dept: ENDOCRINOLOGY | Facility: CLINIC | Age: 54
End: 2024-07-12

## 2024-07-12 NOTE — TELEPHONE ENCOUNTER
"Patient called office very upset & asked to speak w/ supervisor. I listened to patient's concerns & apologized for the issues she experienced with trying to find answers to her question pertaining to her UA results. I informed patient that I would discuss this w/ Dr. Delaney JENNINGS & we would get back to her w/ misty decision ASAP, as the patient informed me she is leaving to go out of town in the next few hours. I went to Dr. Baltazar & explained the issue. He then called the patient. Below is his wrap up of the patient call;    \"I spoke with Melody Georges [birth year].  She stated that she saw the abnormality and started to freak out.  She states she spoke with someone who said that I ordered the wrong test . She is not having any urinary tract infection symptoms.  The urine micro test was ordered as a screening for kidney problems, she was not having any urinary tract infection symptoms.  This was simply a screening test.  In fact it was not the wrong test.  There were no white blood cells in the urine and she is not having any type of urinary symptoms, the results with bacteria in the urine is not uncommon with giving a urine sample that may be contaminated from touching the outer area of the vagina and the skin and hair.  This test was not intended to diagnose a urinary tract infection because she was not having any urinary tract infection symptoms.  She is not having any symptoms and at this time does not require any treatment.  She will contact me if she develops any urinary tract infection symptoms while on vacation.\"  "

## 2024-07-17 ENCOUNTER — APPOINTMENT (OUTPATIENT)
Dept: PRIMARY CARE | Facility: CLINIC | Age: 54
End: 2024-07-17
Payer: MEDICAID

## 2024-07-22 ASSESSMENT — ENCOUNTER SYMPTOMS
SHORTNESS OF BREATH: 0
DIARRHEA: 0
NAUSEA: 0
ENDOCRINE NEGATIVE: 1
DIZZINESS: 0
DIFFICULTY URINATING: 0
FEVER: 0

## 2024-07-23 DIAGNOSIS — K21.9 GASTROESOPHAGEAL REFLUX DISEASE WITHOUT ESOPHAGITIS: ICD-10-CM

## 2024-07-25 DIAGNOSIS — K21.9 GASTROESOPHAGEAL REFLUX DISEASE WITHOUT ESOPHAGITIS: ICD-10-CM

## 2024-07-25 RX ORDER — DEXLANSOPRAZOLE 60 MG/1
1 CAPSULE, DELAYED RELEASE ORAL DAILY
Qty: 90 CAPSULE | Refills: 1 | Status: SHIPPED | OUTPATIENT
Start: 2024-07-25

## 2024-07-25 RX ORDER — DEXLANSOPRAZOLE 60 MG/1
1 CAPSULE, DELAYED RELEASE ORAL DAILY PRN
Qty: 90 CAPSULE | Refills: 1 | OUTPATIENT
Start: 2024-07-25

## 2024-07-25 NOTE — TELEPHONE ENCOUNTER
Pt no longer wants to see Dr Baltazar and will be staying with Dr Archer and PCP, advised pt that Dr Archer does not give any scheduled meds

## 2024-07-26 ENCOUNTER — HOSPITAL ENCOUNTER (OUTPATIENT)
Dept: RADIOLOGY | Facility: HOSPITAL | Age: 54
Discharge: HOME | End: 2024-07-26
Payer: MEDICAID

## 2024-07-26 DIAGNOSIS — R13.10 DYSPHAGIA, UNSPECIFIED: ICD-10-CM

## 2024-07-26 PROCEDURE — 2500000001 HC RX 250 WO HCPCS SELF ADMINISTERED DRUGS (ALT 637 FOR MEDICARE OP): Mod: SE | Performed by: INTERNAL MEDICINE

## 2024-07-26 PROCEDURE — A9698 NON-RAD CONTRAST MATERIALNOC: HCPCS | Mod: SE | Performed by: INTERNAL MEDICINE

## 2024-07-26 PROCEDURE — 74220 X-RAY XM ESOPHAGUS 1CNTRST: CPT

## 2024-07-26 PROCEDURE — 2500000005 HC RX 250 GENERAL PHARMACY W/O HCPCS: Mod: SE | Performed by: INTERNAL MEDICINE

## 2024-07-29 ENCOUNTER — HOSPITAL ENCOUNTER (OUTPATIENT)
Dept: RADIOLOGY | Facility: CLINIC | Age: 54
Discharge: HOME | End: 2024-07-29
Payer: OTHER GOVERNMENT

## 2024-07-29 ENCOUNTER — APPOINTMENT (OUTPATIENT)
Dept: PRIMARY CARE | Facility: CLINIC | Age: 54
End: 2024-07-29
Payer: MEDICAID

## 2024-07-29 VITALS
BODY MASS INDEX: 33.86 KG/M2 | SYSTOLIC BLOOD PRESSURE: 122 MMHG | WEIGHT: 184 LBS | HEIGHT: 62 IN | RESPIRATION RATE: 16 BRPM | OXYGEN SATURATION: 97 % | DIASTOLIC BLOOD PRESSURE: 79 MMHG | HEART RATE: 69 BPM

## 2024-07-29 DIAGNOSIS — M54.50 LOW BACK PAIN WITHOUT SCIATICA, UNSPECIFIED BACK PAIN LATERALITY, UNSPECIFIED CHRONICITY: ICD-10-CM

## 2024-07-29 DIAGNOSIS — K21.9 GASTROESOPHAGEAL REFLUX DISEASE WITHOUT ESOPHAGITIS: ICD-10-CM

## 2024-07-29 DIAGNOSIS — R39.15 URINARY URGENCY: ICD-10-CM

## 2024-07-29 DIAGNOSIS — F41.8 DEPRESSION WITH ANXIETY: ICD-10-CM

## 2024-07-29 LAB
POC APPEARANCE, URINE: CLEAR
POC BILIRUBIN, URINE: NEGATIVE
POC BLOOD, URINE: NEGATIVE
POC COLOR, URINE: YELLOW
POC GLUCOSE, URINE: NEGATIVE MG/DL
POC KETONES, URINE: NEGATIVE MG/DL
POC LEUKOCYTES, URINE: NEGATIVE
POC NITRITE,URINE: NEGATIVE
POC PH, URINE: 7 PH
POC PROTEIN, URINE: NEGATIVE MG/DL
POC SPECIFIC GRAVITY, URINE: >=1.03
POC UROBILINOGEN, URINE: 0.2 EU/DL

## 2024-07-29 PROCEDURE — 99213 OFFICE O/P EST LOW 20 MIN: CPT | Performed by: INTERNAL MEDICINE

## 2024-07-29 PROCEDURE — 72110 X-RAY EXAM L-2 SPINE 4/>VWS: CPT

## 2024-07-29 PROCEDURE — 3008F BODY MASS INDEX DOCD: CPT | Performed by: INTERNAL MEDICINE

## 2024-07-29 PROCEDURE — 1036F TOBACCO NON-USER: CPT | Performed by: INTERNAL MEDICINE

## 2024-07-29 PROCEDURE — 81002 URINALYSIS NONAUTO W/O SCOPE: CPT | Performed by: INTERNAL MEDICINE

## 2024-07-29 PROCEDURE — 72110 X-RAY EXAM L-2 SPINE 4/>VWS: CPT | Performed by: RADIOLOGY

## 2024-07-29 RX ORDER — SERTRALINE HYDROCHLORIDE 25 MG/1
25 TABLET, FILM COATED ORAL
COMMUNITY
Start: 2024-02-21

## 2024-07-29 RX ORDER — DEXLANSOPRAZOLE 60 MG/1
1 CAPSULE, DELAYED RELEASE ORAL DAILY
Qty: 90 CAPSULE | Refills: 1 | Status: SHIPPED | OUTPATIENT
Start: 2024-07-29

## 2024-07-29 ASSESSMENT — ENCOUNTER SYMPTOMS
PSYCHIATRIC NEGATIVE: 1
BACK PAIN: 1
RESPIRATORY NEGATIVE: 1
NEUROLOGICAL NEGATIVE: 1
CONSTITUTIONAL NEGATIVE: 1
CARDIOVASCULAR NEGATIVE: 1
GASTROINTESTINAL NEGATIVE: 1

## 2024-07-29 ASSESSMENT — PATIENT HEALTH QUESTIONNAIRE - PHQ9
2. FEELING DOWN, DEPRESSED OR HOPELESS: NOT AT ALL
SUM OF ALL RESPONSES TO PHQ9 QUESTIONS 1 AND 2: 0
1. LITTLE INTEREST OR PLEASURE IN DOING THINGS: NOT AT ALL

## 2024-07-29 ASSESSMENT — COLUMBIA-SUICIDE SEVERITY RATING SCALE - C-SSRS
1. IN THE PAST MONTH, HAVE YOU WISHED YOU WERE DEAD OR WISHED YOU COULD GO TO SLEEP AND NOT WAKE UP?: NO
6. HAVE YOU EVER DONE ANYTHING, STARTED TO DO ANYTHING, OR PREPARED TO DO ANYTHING TO END YOUR LIFE?: NO
2. HAVE YOU ACTUALLY HAD ANY THOUGHTS OF KILLING YOURSELF?: NO

## 2024-07-29 ASSESSMENT — PAIN SCALES - GENERAL: PAINLEVEL: 8

## 2024-07-29 NOTE — PATIENT INSTRUCTIONS
It was great to see you in the office today! Here is what we discussed at your visit today:  Get xray of your back today as discussed, we will call you with results  Please continue to take your current medications   Follow up in four months

## 2024-07-29 NOTE — ASSESSMENT & PLAN NOTE
She was not able to tolerate Wellbutrin or Sertraline  Advised to continue with Lexapro 5mg daily if she is able to tolerate it  She states it has been working well for her   [FreeTextEntry1] : Wellness [de-identified] : 83 yo M with dementia, colon ca s/p resection \par recently off memantine - was having multiple side effects, now improving.  \par Was having le edema which has improved. Now on a low salt diet.\par Eating more cookies and candies recently.  Has gained some weight.\par Back pain- has a dx of lumbar stenosis-  PT helped a little.  Epidural- also helped.

## 2024-07-29 NOTE — PROGRESS NOTES
"Patient ID: She states that she has been having a lot of lower back pain, feels very tight in lower back region. She states she has seen chiropractor and had massage therapy, but she has pain all day and night long. She denies any CP, cough, SOB. She states that she is seeing Dr. Mathew for scope, has had esophageal dilatation in the past. She is due for colonoscopy in 2026. She states she that she is back on Lexapro 5mg daily. She states she took Sertraline for one day, and then stopped because she did not like the way she felt. She states that she is starting to \"level off\", states she has been this weight for years. She states that when she was on Lexapro 10mg daily, she felt \"loopy\" and was always hungry. She states she saw [facility] for skin cancer removal and that is starting to heal(had procedure in May). She has been having urinary frequency, no burning. UA negative today, A1c on 7/9/24 was 5.3.     EMILIA Georges is a 54 y.o. female with PMH remarkable for GERD, Depression/Anxiety, Osteopenia, thyroid nodules  who presents to the office today for follow up.    HEALTH MAINTENANCE: FOLLOW UP   Last Office Visit: 8/23/23  Mammogram (40-75): 8/31/23 normal  Pap smear (21-65, or hysterectomy q5yrs): n/a age  Last Labs: 7/9/24  Colonoscopy (45-75 or age 40 with 1st degree relative dx colon ca): ordered previously  Lung cancer screening (50-78 y/o x 20 pk yr for at least 20 yrs + current smoker OR quit in last 15 years, no CT w/I last year): n/a  DEXA (65+, q 2 years): n/a  - she has been seen by Dr. Baltazar who has prescribed Adipex, but was not able to tolerate it  - was seen by armando on 7/10/24, offered to be seen by diabetic educator, and per note, she will think about it; she has tried and been intolerant to wellbutrin, Adipex and GLP1    Social History     Tobacco Use    Smoking status: Former     Types: Cigarettes    Smokeless tobacco: Never   Vaping Use    Vaping status: Never Used   Substance " "Use Topics    Alcohol use: Not Currently    Drug use: Never     Review of Systems   Constitutional: Negative.    HENT: Negative.     Respiratory: Negative.     Cardiovascular: Negative.    Gastrointestinal: Negative.    Genitourinary: Negative.    Musculoskeletal:  Positive for back pain.   Neurological: Negative.    Psychiatric/Behavioral: Negative.       Visit Vitals  Vitals:    07/29/24 1053   BP: 122/79   BP Location: Right arm   Pulse: 69   Resp: 16   SpO2: 97%   Weight: 83.5 kg (184 lb)   Height: 1.575 m (5' 2\")      OB Status Unknown   Smoking Status Former     Physical Exam  Vitals reviewed.   Constitutional:       Appearance: Normal appearance.   HENT:      Head: Normocephalic and atraumatic.   Cardiovascular:      Rate and Rhythm: Normal rate and regular rhythm.      Pulses: Normal pulses.      Heart sounds: Normal heart sounds.   Pulmonary:      Effort: Pulmonary effort is normal.      Breath sounds: Normal breath sounds.   Abdominal:      General: Bowel sounds are normal.      Palpations: Abdomen is soft.   Musculoskeletal:         General: Normal range of motion.      Comments: Tenderness over lower lumbar spine including paraspinal muscles; no neurological deficits noted   Skin:     General: Skin is warm and dry.   Neurological:      General: No focal deficit present.      Mental Status: She is alert and oriented to person, place, and time.   Psychiatric:         Mood and Affect: Mood normal.         Behavior: Behavior normal.     Current Outpatient Medications   Medication Instructions    ascorbic acid (VITAMIN C) 1,000 mg, oral, Daily    Ca-D3-mag-zinc--sanjuanita-boron (Caltrate 600-D Plus Minerals) 600 mg calcium- 800 unit-40 mg tablet,chewable 1 tablet, oral, Daily    calcium carbonate (Oscal) 500 mg calcium (1,250 mg) tablet as directed Orally chewable    Dexilant 60 mg, oral, Daily    docusate sodium (Colace) 100 mg capsule 1 capsule, oral, Daily    escitalopram (LEXAPRO) 5 mg, oral, Daily    " estradiol (ESTRACE) 1 mg, oral, Daily    fluconazole (Diflucan) 150 mg tablet 1 tablet, oral, Daily    LORazepam (ATIVAN) 0.5 mg, oral, Daily PRN    ondansetron ODT (ZOFRAN-ODT) 4 mg, oral, 3 times daily      Lab Results   Component Value Date    WBC 5.8 07/09/2024    HGB 13.0 07/09/2024    HCT 40.1 07/09/2024     07/09/2024    CHOL 197 07/09/2024    TRIG 115 07/09/2024    HDL 64.6 07/09/2024    ALT 12 07/09/2024    AST 17 07/09/2024     07/09/2024    K 4.2 07/09/2024     07/09/2024    CREATININE 0.78 07/09/2024    BUN 16 07/09/2024    CO2 30 07/09/2024    TSH 1.53 07/09/2024    HGBA1C 5.3 07/09/2024     Problem List Items Addressed This Visit             ICD-10-CM    Gastroesophageal reflux disease K21.9    Relevant Medications    dexlansoprazole (Dexilant) 60 mg DR capsule    Depression with anxiety F41.8     She was not able to tolerate Wellbutrin or Sertraline  Advised to continue with Lexapro 5mg daily if she is able to tolerate it  She states it has been working well for her         Low back pain without sciatica  - will get xray of lumbar spine    M54.50    Urinary urgency R39.15    Relevant Orders    POCT UA (nonautomated) manually resulted (Completed)   -------------------  Written by Maria Elena Tracy LPN, acting as a scribe for Dr. Archer. This note accurately reflects the work and decisions made by Dr. Archer.     I, Dr. Archer, attest all medical record entries made by the scribe were under my direction and were personally dictated by me. I have reviewed the chart and agree that the record accurately reflects my performance of the history, physical exam, and assessment and plan.

## 2024-07-31 DIAGNOSIS — M47.819 FACET JOINT DISEASE: ICD-10-CM

## 2024-09-04 ENCOUNTER — APPOINTMENT (OUTPATIENT)
Dept: RADIOLOGY | Facility: HOSPITAL | Age: 54
End: 2024-09-04
Payer: OTHER GOVERNMENT

## 2024-09-30 ENCOUNTER — HOSPITAL ENCOUNTER (OUTPATIENT)
Dept: RADIOLOGY | Facility: HOSPITAL | Age: 54
Discharge: HOME | End: 2024-09-30
Payer: OTHER GOVERNMENT

## 2024-09-30 VITALS — BODY MASS INDEX: 32.6 KG/M2 | WEIGHT: 184 LBS | HEIGHT: 63 IN

## 2024-09-30 DIAGNOSIS — Z12.31 ENCOUNTER FOR SCREENING MAMMOGRAM FOR MALIGNANT NEOPLASM OF BREAST: ICD-10-CM

## 2024-09-30 PROCEDURE — 77067 SCR MAMMO BI INCL CAD: CPT

## 2024-09-30 PROCEDURE — 77067 SCR MAMMO BI INCL CAD: CPT | Performed by: RADIOLOGY

## 2024-09-30 PROCEDURE — 77063 BREAST TOMOSYNTHESIS BI: CPT | Performed by: RADIOLOGY

## 2024-11-18 ENCOUNTER — APPOINTMENT (OUTPATIENT)
Dept: PHYSICAL THERAPY | Facility: HOSPITAL | Age: 54
End: 2024-11-18
Payer: OTHER GOVERNMENT

## 2024-12-02 ENCOUNTER — APPOINTMENT (OUTPATIENT)
Dept: PRIMARY CARE | Facility: CLINIC | Age: 54
End: 2024-12-02
Payer: OTHER GOVERNMENT

## 2024-12-21 ENCOUNTER — APPOINTMENT (OUTPATIENT)
Dept: CARDIOLOGY | Facility: HOSPITAL | Age: 54
End: 2024-12-21
Payer: MEDICAID

## 2024-12-21 ENCOUNTER — APPOINTMENT (OUTPATIENT)
Dept: RADIOLOGY | Facility: HOSPITAL | Age: 54
End: 2024-12-21
Payer: MEDICAID

## 2024-12-21 ENCOUNTER — HOSPITAL ENCOUNTER (EMERGENCY)
Facility: HOSPITAL | Age: 54
Discharge: HOME | End: 2024-12-21
Attending: EMERGENCY MEDICINE
Payer: MEDICAID

## 2024-12-21 VITALS
HEIGHT: 63 IN | OXYGEN SATURATION: 100 % | RESPIRATION RATE: 13 BRPM | BODY MASS INDEX: 32.96 KG/M2 | SYSTOLIC BLOOD PRESSURE: 105 MMHG | WEIGHT: 186 LBS | HEART RATE: 56 BPM | DIASTOLIC BLOOD PRESSURE: 81 MMHG | TEMPERATURE: 97.8 F

## 2024-12-21 DIAGNOSIS — R07.9 CHEST PAIN, UNSPECIFIED TYPE: Primary | ICD-10-CM

## 2024-12-21 LAB
ALBUMIN SERPL BCP-MCNC: 3.8 G/DL (ref 3.4–5)
ALP SERPL-CCNC: 60 U/L (ref 33–110)
ALT SERPL W P-5'-P-CCNC: 15 U/L (ref 7–45)
ANION GAP SERPL CALC-SCNC: 9 MMOL/L (ref 10–20)
AST SERPL W P-5'-P-CCNC: 17 U/L (ref 9–39)
BASOPHILS # BLD AUTO: 0.05 X10*3/UL (ref 0–0.1)
BASOPHILS NFR BLD AUTO: 0.6 %
BILIRUB SERPL-MCNC: 0.6 MG/DL (ref 0–1.2)
BUN SERPL-MCNC: 19 MG/DL (ref 6–23)
CALCIUM SERPL-MCNC: 8.8 MG/DL (ref 8.6–10.3)
CARDIAC TROPONIN I PNL SERPL HS: <3 NG/L (ref 0–13)
CARDIAC TROPONIN I PNL SERPL HS: <3 NG/L (ref 0–13)
CHLORIDE SERPL-SCNC: 105 MMOL/L (ref 98–107)
CO2 SERPL-SCNC: 28 MMOL/L (ref 21–32)
CREAT SERPL-MCNC: 0.83 MG/DL (ref 0.5–1.05)
D DIMER PPP FEU-MCNC: 425 NG/ML FEU
EGFRCR SERPLBLD CKD-EPI 2021: 83 ML/MIN/1.73M*2
EOSINOPHIL # BLD AUTO: 0.2 X10*3/UL (ref 0–0.7)
EOSINOPHIL NFR BLD AUTO: 2.3 %
ERYTHROCYTE [DISTWIDTH] IN BLOOD BY AUTOMATED COUNT: 12.7 % (ref 11.5–14.5)
FLUAV RNA RESP QL NAA+PROBE: NOT DETECTED
FLUBV RNA RESP QL NAA+PROBE: NOT DETECTED
GLUCOSE SERPL-MCNC: 94 MG/DL (ref 74–99)
HCT VFR BLD AUTO: 40.2 % (ref 36–46)
HGB BLD-MCNC: 13 G/DL (ref 12–16)
IMM GRANULOCYTES # BLD AUTO: 0.04 X10*3/UL (ref 0–0.7)
IMM GRANULOCYTES NFR BLD AUTO: 0.5 % (ref 0–0.9)
LYMPHOCYTES # BLD AUTO: 2.82 X10*3/UL (ref 1.2–4.8)
LYMPHOCYTES NFR BLD AUTO: 31.9 %
MAGNESIUM SERPL-MCNC: 2.07 MG/DL (ref 1.6–2.4)
MCH RBC QN AUTO: 30.3 PG (ref 26–34)
MCHC RBC AUTO-ENTMCNC: 32.3 G/DL (ref 32–36)
MCV RBC AUTO: 94 FL (ref 80–100)
MONOCYTES # BLD AUTO: 0.75 X10*3/UL (ref 0.1–1)
MONOCYTES NFR BLD AUTO: 8.5 %
NEUTROPHILS # BLD AUTO: 4.99 X10*3/UL (ref 1.2–7.7)
NEUTROPHILS NFR BLD AUTO: 56.2 %
NRBC BLD-RTO: 0 /100 WBCS (ref 0–0)
PLATELET # BLD AUTO: 294 X10*3/UL (ref 150–450)
POTASSIUM SERPL-SCNC: 4.3 MMOL/L (ref 3.5–5.3)
PROT SERPL-MCNC: 6.3 G/DL (ref 6.4–8.2)
RBC # BLD AUTO: 4.29 X10*6/UL (ref 4–5.2)
SARS-COV-2 RNA RESP QL NAA+PROBE: NOT DETECTED
SODIUM SERPL-SCNC: 138 MMOL/L (ref 136–145)
WBC # BLD AUTO: 8.9 X10*3/UL (ref 4.4–11.3)

## 2024-12-21 PROCEDURE — 71045 X-RAY EXAM CHEST 1 VIEW: CPT

## 2024-12-21 PROCEDURE — 93005 ELECTROCARDIOGRAM TRACING: CPT

## 2024-12-21 PROCEDURE — 84075 ASSAY ALKALINE PHOSPHATASE: CPT | Performed by: EMERGENCY MEDICINE

## 2024-12-21 PROCEDURE — 83735 ASSAY OF MAGNESIUM: CPT | Performed by: EMERGENCY MEDICINE

## 2024-12-21 PROCEDURE — 84484 ASSAY OF TROPONIN QUANT: CPT | Performed by: EMERGENCY MEDICINE

## 2024-12-21 PROCEDURE — 71045 X-RAY EXAM CHEST 1 VIEW: CPT | Mod: FOREIGN READ | Performed by: RADIOLOGY

## 2024-12-21 PROCEDURE — 87636 SARSCOV2 & INF A&B AMP PRB: CPT | Performed by: EMERGENCY MEDICINE

## 2024-12-21 PROCEDURE — 99285 EMERGENCY DEPT VISIT HI MDM: CPT | Performed by: EMERGENCY MEDICINE

## 2024-12-21 PROCEDURE — 85379 FIBRIN DEGRADATION QUANT: CPT | Performed by: EMERGENCY MEDICINE

## 2024-12-21 PROCEDURE — 85025 COMPLETE CBC W/AUTO DIFF WBC: CPT | Performed by: EMERGENCY MEDICINE

## 2024-12-21 PROCEDURE — 36415 COLL VENOUS BLD VENIPUNCTURE: CPT | Performed by: EMERGENCY MEDICINE

## 2024-12-21 ASSESSMENT — HEART SCORE
AGE: 45-64
HISTORY: SLIGHTLY SUSPICIOUS
HEART SCORE: 1
ECG: NORMAL
TROPONIN: LESS THAN OR EQUAL TO NORMAL LIMIT
RISK FACTORS: NO KNOWN RISK FACTORS

## 2024-12-21 ASSESSMENT — PAIN SCALES - GENERAL
PAINLEVEL_OUTOF10: 8
PAINLEVEL_OUTOF10: 8

## 2024-12-21 ASSESSMENT — COLUMBIA-SUICIDE SEVERITY RATING SCALE - C-SSRS
6. HAVE YOU EVER DONE ANYTHING, STARTED TO DO ANYTHING, OR PREPARED TO DO ANYTHING TO END YOUR LIFE?: NO
2. HAVE YOU ACTUALLY HAD ANY THOUGHTS OF KILLING YOURSELF?: NO
1. IN THE PAST MONTH, HAVE YOU WISHED YOU WERE DEAD OR WISHED YOU COULD GO TO SLEEP AND NOT WAKE UP?: NO

## 2024-12-21 ASSESSMENT — PAIN - FUNCTIONAL ASSESSMENT: PAIN_FUNCTIONAL_ASSESSMENT: 0-10

## 2024-12-21 ASSESSMENT — PAIN DESCRIPTION - PAIN TYPE: TYPE: ACUTE PAIN

## 2024-12-21 ASSESSMENT — PAIN DESCRIPTION - LOCATION: LOCATION: CHEST

## 2024-12-21 ASSESSMENT — PAIN DESCRIPTION - DESCRIPTORS: DESCRIPTORS: PRESSURE

## 2024-12-21 NOTE — ED NOTES
Patient to ed with chest pain 8/10 that has been intermittent for 3 days.      Rosalee Hays RN  12/21/24 1412

## 2024-12-21 NOTE — ED PROVIDER NOTES
HPI   Chief Complaint   Patient presents with    Chest Pain       HPI  Patient is a 54-year-old female presenting to the ED today for chest pain.  Patient states that she has had left-sided chest pain for about the past 3 days.  She notes that it has been constant in nature, but when she is active and doing things, she will forget about it.  She initially thought it was reflux and has taken antacids with no improvement.  She mentioned it to a family member today who is a nurse, and came in at the insistence of her family member.  She otherwise denies any associated shortness of breath.  She does note feeling somewhat tired and rundown over the past several days.  She denies any fever, cough, abdominal pain, vomiting, or changes in bowel or bladder habits.  She does take exogenous estrogen following her hysterectomy.  She otherwise denies any history of hypertension, hyperlipidemia, or diabetes.  She denies any cigarette use.      Patient History   Past Medical History:   Diagnosis Date    Acute bronchitis 2023    Acute URI 2023    ADHD (attention deficit hyperactivity disorder)     Allergic rhinitis 2023    Anxiety 1986    Arnold-Chiari syndrome without spina bifida or hydrocephalus (Multi)     COVID-19 2023    Depression with anxiety     [facility]    Dermatitis 2023    Fatigue 2023    Folliculitis 2023    GERD (gastroesophageal reflux disease)     History of iron deficiency anemia     Occult blood positive stool 2023    Thyroid nodule      Past Surgical History:   Procedure Laterality Date     SECTION, CLASSIC  2013     Section     SECTION, LOW TRANSVERSE      OTHER SURGICAL HISTORY  2013    Salpingo-oophorectomy    TONSILLECTOMY  2018    Tonsillectomy    TOTAL ABDOMINAL HYSTERECTOMY  2013    Total Abdominal Hysterectomy     Family History   Problem Relation Name Age of Onset    Uterine cancer Mother  Yanira nelson     Atrial fibrillation Mother Yanira nelson     Blood clot Mother Yanira nelson     Cancer Mother Yanira nelson     Depression Daughter Shelley segovia     Breast cancer Paternal Grandmother       Social History     Tobacco Use    Smoking status: Former     Current packs/day: 0.00     Types: Cigarettes    Smokeless tobacco: Never   Vaping Use    Vaping status: Never Used   Substance Use Topics    Alcohol use: Not Currently    Drug use: Never       Physical Exam   ED Triage Vitals   Temperature Heart Rate Respirations BP   12/21/24 1326 12/21/24 1326 12/21/24 1326 12/21/24 1326   36.8 °C (98.3 °F) 65 16 (!) 121/91      Pulse Ox Temp src Heart Rate Source Patient Position   12/21/24 1329 -- -- --   98 %         BP Location FiO2 (%)     -- --             Physical Exam  Vitals and nursing note reviewed.   Constitutional:       General: She is not in acute distress.     Appearance: She is not toxic-appearing.   HENT:      Head: Normocephalic.      Mouth/Throat:      Mouth: Mucous membranes are moist.   Eyes:      Extraocular Movements: Extraocular movements intact.      Conjunctiva/sclera: Conjunctivae normal.   Cardiovascular:      Rate and Rhythm: Normal rate and regular rhythm.      Pulses: Normal pulses.   Pulmonary:      Effort: Pulmonary effort is normal. No respiratory distress.      Breath sounds: Normal breath sounds. No wheezing.   Abdominal:      General: There is no distension.      Palpations: Abdomen is soft.      Tenderness: There is no abdominal tenderness.   Musculoskeletal:         General: No swelling.      Cervical back: Neck supple.   Skin:     General: Skin is warm and dry.      Capillary Refill: Capillary refill takes less than 2 seconds.   Neurological:      General: No focal deficit present.      Mental Status: She is alert. Mental status is at baseline.           ED Course & MDM   ED Course as of 12/21/24 1529   Sat Dec 21, 2024   1340 EKG obtained at 1327, interpreted by  myself.  Normal sinus rhythm with a ventricular rate of 66, no axis deviation, normal intervals, with no acute ischemic changes [VT]   1523 EKG obtained at 1432, interpreted by myself.  Sinus bradycardia with a ventricular rate of 54, no axis deviation, normal intervals, with no acute ischemic changes [VT]      ED Course User Index  [VT] Dana PANTOJA MD         Diagnoses as of 12/21/24 1529   Chest pain, unspecified type             No data recorded     Javier Coma Scale Score: 15 (12/21/24 1359 : Maria Elena Catherine RN)                       Medical Decision Making  Patient was seen and evaluated for chest pain.  Differential diagnosis includes but is not limited to ACS, PE, AAA, Unstable angina, Aortic Dissection, GERD, Trauma, Viral Infection, MSK Pain, Costochondritis, Chest wall pain, COPD, CHF.  Initial EKG does not show any acute ischemic changes.  Patient is placed on a cardiac monitor with continuous pulse ox.  Additional labs and imaging are ordered for further evaluation of the patient's symptoms.    CBC is unremarkable.  CMP is unremarkable.  Magnesium is normal at 2.  D-dimer is normal at 425.  High sensitivity troponin is negative, with repeat still negative.  COVID and influenza swabs are negative.  XR chest 1 view   Final Result   No acute cardiopulmonary disease.   Signed by Mukul Villatoro MD        On reevaluation, patient is resting comfortably in bed.  Patient has a heart score of 1. Patient was informed of their lab and imaging results, and all questions and concerns were answered. Discharge planning with close outpatient follow-up was discussed at this time, to which the patient was agreeable. Strict return precautions were given, and patient was discharged home in stable condition.    Procedure  Procedures     Dana PANTOJA MD  12/21/24 1312

## 2024-12-26 LAB
ATRIAL RATE: 54 BPM
ATRIAL RATE: 66 BPM
P AXIS: 55 DEGREES
P AXIS: 68 DEGREES
P OFFSET: 180 MS
P OFFSET: 191 MS
P ONSET: 129 MS
P ONSET: 141 MS
PR INTERVAL: 156 MS
PR INTERVAL: 176 MS
Q ONSET: 217 MS
Q ONSET: 219 MS
QRS COUNT: 11 BEATS
QRS COUNT: 9 BEATS
QRS DURATION: 84 MS
QRS DURATION: 86 MS
QT INTERVAL: 406 MS
QT INTERVAL: 444 MS
QTC CALCULATION(BAZETT): 421 MS
QTC CALCULATION(BAZETT): 425 MS
QTC FREDERICIA: 419 MS
QTC FREDERICIA: 428 MS
R AXIS: 24 DEGREES
R AXIS: 31 DEGREES
T AXIS: 10 DEGREES
T AXIS: 5 DEGREES
T OFFSET: 422 MS
T OFFSET: 439 MS
VENTRICULAR RATE: 54 BPM
VENTRICULAR RATE: 66 BPM

## 2025-01-03 DIAGNOSIS — K21.9 GASTROESOPHAGEAL REFLUX DISEASE WITHOUT ESOPHAGITIS: ICD-10-CM

## 2025-01-03 RX ORDER — DEXLANSOPRAZOLE 60 MG/1
1 CAPSULE, DELAYED RELEASE ORAL DAILY
Qty: 90 CAPSULE | Refills: 1 | Status: SHIPPED | OUTPATIENT
Start: 2025-01-03

## 2025-01-13 ENCOUNTER — APPOINTMENT (OUTPATIENT)
Dept: PRIMARY CARE | Facility: CLINIC | Age: 55
End: 2025-01-13
Payer: MEDICAID

## 2025-01-28 ENCOUNTER — APPOINTMENT (OUTPATIENT)
Dept: PRIMARY CARE | Facility: CLINIC | Age: 55
End: 2025-01-28
Payer: OTHER GOVERNMENT

## 2025-02-12 DIAGNOSIS — F32.A DEPRESSION, UNSPECIFIED DEPRESSION TYPE: ICD-10-CM

## 2025-02-12 RX ORDER — ESCITALOPRAM OXALATE 5 MG/1
5 TABLET ORAL DAILY
Qty: 30 TABLET | Refills: 0 | Status: SHIPPED | OUTPATIENT
Start: 2025-02-12

## 2025-02-28 DIAGNOSIS — F41.8 DEPRESSION WITH ANXIETY: ICD-10-CM

## 2025-02-28 RX ORDER — FLUOXETINE 10 MG/1
10 CAPSULE ORAL DAILY
Qty: 90 CAPSULE | Refills: 0 | Status: SHIPPED | OUTPATIENT
Start: 2025-02-28 | End: 2025-05-29

## 2025-03-04 NOTE — PROGRESS NOTES
Patient ID:       HEALTH MAINTENANCE: FOLLOW UP   Last Office Visit: 7/29/24, advised to continue with lexapro 5mg daily, xray of lumbar spine ordered due to complaint of back pain  Mammogram (40-75): 9/30/24 wnl  Pap smear (21-65, or hysterectomy q5yrs): per ob/gyn  Last Labs: 12/21/24  Colonoscopy (45-75 or age 40 with 1st degree relative dx colon ca): EGD on 08/23/24 normal other than bile gastritis, Colonoscopy 2016 per dr. Erasmo valdes advised to repeat in ten years  Lung cancer screening (50-76 y/o x 20 pk yr for at least 20 yrs + current smoker OR quit in last 15 years, no CT w/I last year): n/a  DEXA (65+, q 2 years): n/a    - she has been seen by Dr. Baltazar who prescribed Adipex in the past, but was not able to tolerate it  - was seen by endo on 7/10/24, offered to be seen by diabetic educator, and per note, she will think about it; she has tried and been intolerant to wellbutrin, Adipex and GLP1    --seen in ER on 12/21/24 for chest pain, workup as follows:   CBC is unremarkable.  CMP is unremarkable.  Magnesium is normal at 2.  D-dimer is normal at 425.  High sensitivity troponin is negative, with repeat still negative.  COVID and influenza swabs are negative.  XR chest 1 view   Final Result   No acute cardiopulmonary disease.           EMILIA Georges is a 54 y.o. female with PMH remarkable for  GERD, Depression/Anxiety, Osteopenia, thyroid nodules   who presents to the office today for follow up.     Social History     Tobacco Use    Smoking status: Former     Current packs/day: 0.00     Types: Cigarettes    Smokeless tobacco: Never   Vaping Use    Vaping status: Never Used   Substance Use Topics    Alcohol use: Not Currently    Drug use: Never     Review of Systems  Visit Vitals  OB Status Menopausal   Smoking Status Former     Physical Exam  Current Outpatient Medications   Medication Instructions    ascorbic acid (VITAMIN C) 1,000 mg, oral, Daily    Ca-D3-mag-zinc--sanjuanita-boron (Caltrate 600-D  Plus Minerals) 600 mg calcium- 800 unit-40 mg tablet,chewable 1 tablet, oral, Daily    Dexilant 60 mg, oral, Daily    docusate sodium (Colace) 100 mg capsule 1 capsule, oral, Daily    estradiol (ESTRACE) 1 mg, oral, Daily    fluconazole (Diflucan) 150 mg tablet 1 tablet, oral, Daily    FLUoxetine (PROZAC) 10 mg, oral, Daily    ondansetron ODT (ZOFRAN-ODT) 4 mg, oral, 3 times daily    sertraline (ZOLOFT) 25 mg, oral, Daily RT      Lab Results   Component Value Date    WBC 8.9 12/21/2024    HGB 13.0 12/21/2024    HCT 40.2 12/21/2024     12/21/2024    CHOL 197 07/09/2024    TRIG 115 07/09/2024    HDL 64.6 07/09/2024    ALT 15 12/21/2024    AST 17 12/21/2024     12/21/2024    K 4.3 12/21/2024     12/21/2024    CREATININE 0.83 12/21/2024    BUN 19 12/21/2024    CO2 28 12/21/2024    TSH 1.53 07/09/2024    HGBA1C 5.3 07/09/2024           --------------------  Written by Fernanda Tracy RN, acting as a scribe for Dr. Archer. This note accurately reflects the work and decisions made by Dr. Archer.     I, Dr. Archer, attest all medical record entries made by the scribe were under my direction and were personally dictated by me. I have reviewed the chart and agree that the record accurately reflects my performance of the history, physical exam, and assessment and plan.

## 2025-03-05 ENCOUNTER — APPOINTMENT (OUTPATIENT)
Dept: PRIMARY CARE | Facility: CLINIC | Age: 55
End: 2025-03-05
Payer: OTHER GOVERNMENT

## 2025-03-05 VITALS
SYSTOLIC BLOOD PRESSURE: 112 MMHG | HEART RATE: 57 BPM | DIASTOLIC BLOOD PRESSURE: 76 MMHG | HEIGHT: 63 IN | WEIGHT: 188 LBS | BODY MASS INDEX: 33.31 KG/M2 | OXYGEN SATURATION: 96 % | RESPIRATION RATE: 18 BRPM

## 2025-03-05 DIAGNOSIS — Z13.220 LIPID SCREENING: ICD-10-CM

## 2025-03-05 DIAGNOSIS — E78.2 MIXED HYPERLIPIDEMIA: ICD-10-CM

## 2025-03-05 DIAGNOSIS — R53.83 OTHER FATIGUE: ICD-10-CM

## 2025-03-05 DIAGNOSIS — Z00.00 ROUTINE GENERAL MEDICAL EXAMINATION AT A HEALTH CARE FACILITY: ICD-10-CM

## 2025-03-05 DIAGNOSIS — F41.8 DEPRESSION WITH ANXIETY: ICD-10-CM

## 2025-03-05 DIAGNOSIS — E66.811 CLASS 1 OBESITY WITH BODY MASS INDEX (BMI) OF 32.0 TO 32.9 IN ADULT, UNSPECIFIED OBESITY TYPE, UNSPECIFIED WHETHER SERIOUS COMORBIDITY PRESENT: ICD-10-CM

## 2025-03-05 PROCEDURE — 3008F BODY MASS INDEX DOCD: CPT | Performed by: INTERNAL MEDICINE

## 2025-03-05 PROCEDURE — 1036F TOBACCO NON-USER: CPT | Performed by: INTERNAL MEDICINE

## 2025-03-05 PROCEDURE — 99213 OFFICE O/P EST LOW 20 MIN: CPT | Performed by: INTERNAL MEDICINE

## 2025-03-05 ASSESSMENT — ENCOUNTER SYMPTOMS
MUSCULOSKELETAL NEGATIVE: 1
CARDIOVASCULAR NEGATIVE: 1
GASTROINTESTINAL NEGATIVE: 1
CONSTITUTIONAL NEGATIVE: 1
PSYCHIATRIC NEGATIVE: 1
NEUROLOGICAL NEGATIVE: 1
ENDOCRINE NEGATIVE: 1
RESPIRATORY NEGATIVE: 1
EYES NEGATIVE: 1

## 2025-03-05 ASSESSMENT — PAIN SCALES - GENERAL: PAINLEVEL_OUTOF10: 0-NO PAIN

## 2025-03-05 NOTE — PROGRESS NOTES
Patient ID: She states that she has been doing okay. She states that she has just been dealing with life stresses which causes her to stress eat. States that she has gained about 18lb in a about 6 months. She states that she would like to discuss weight loss medication, states that her sister is on Ozempic and is losing weight and doing great. States that she has pain to both feet at the ball of the foot for the past few months. Denies any injuries, numbness, or loss of sensation or circulation.  States that she is doing well on the Prozac and it has helped her and she hasn't noticed any adverse effects. Denies any issues with her bowel or bladder. Denies any chest pain, shortness of breath, or difficulty sleeping.      HEALTH MAINTENANCE: FOLLOW UP   Last Office Visit: 7/29/24, advised to continue with lexapro 5mg daily, xray of lumbar spine ordered due to complaint of back pain  Mammogram (40-75): 9/30/24 wnl  Pap smear (21-65, or hysterectomy q5yrs): per ob/gyn  Last Labs: 12/21/24  Colonoscopy (45-75 or age 40 with 1st degree relative dx colon ca): EGD on 08/23/24 normal other than bile gastritis, Colonoscopy 2016 per dr. Erasmo valdes advised to repeat in ten years  Lung cancer screening (50-78 y/o x 20 pk yr for at least 20 yrs + current smoker OR quit in last 15 years, no CT w/I last year): n/a  DEXA (65+, q 2 years): n/a    - she has been seen by Dr. Baltazar who prescribed Adipex in the past, but was not able to tolerate it  - was seen by endo on 7/10/24, offered to be seen by diabetic educator, and per note, she will think about it; she has tried and been intolerant to wellbutrin, Adipex and GLP1    --seen in ER on 12/21/24 for chest pain, workup as follows:   CBC is unremarkable.  CMP is unremarkable.  Magnesium is normal at 2.  D-dimer is normal at 425.  High sensitivity troponin is negative, with repeat still negative.  COVID and influenza swabs are negative.  XR chest 1 view   Final Result   No acute  "cardiopulmonary disease.           HPI: HPI: Melody Georges is a 54 y.o. female with PMH remarkable for  GERD, Depression/Anxiety, Osteopenia, thyroid nodules  who presents to the office today for follow up.     Social History     Tobacco Use    Smoking status: Former     Current packs/day: 0.00     Types: Cigarettes    Smokeless tobacco: Never   Vaping Use    Vaping status: Never Used   Substance Use Topics    Alcohol use: Not Currently    Drug use: Never     Review of Systems   Constitutional: Negative.    HENT: Negative.     Eyes: Negative.    Respiratory: Negative.     Cardiovascular: Negative.    Gastrointestinal: Negative.    Endocrine: Negative.    Genitourinary: Negative.    Musculoskeletal: Negative.         Pain to the ball of feet    Skin: Negative.    Neurological: Negative.    Psychiatric/Behavioral: Negative.     All other systems reviewed and are negative.    Visit Vitals  /76   Pulse 57   Resp 18   Ht 1.6 m (5' 3\")   Wt 85.3 kg (188 lb)   SpO2 96%   BMI 33.30 kg/m²   OB Status Menopausal   Smoking Status Former   BSA 1.95 m²     Physical Exam  Vitals and nursing note reviewed. Exam conducted with a chaperone present.   Constitutional:       Appearance: Normal appearance. She is well-developed. She is obese.   HENT:      Head: Normocephalic.      Right Ear: External ear normal.      Left Ear: External ear normal.      Nose: Nose normal.      Mouth/Throat:      Lips: Pink.      Mouth: Mucous membranes are moist.   Eyes:      General: Lids are normal.      Pupils: Pupils are equal, round, and reactive to light.   Neck:      Trachea: Trachea normal.   Cardiovascular:      Rate and Rhythm: Normal rate and regular rhythm.      Pulses: Normal pulses.      Heart sounds: Normal heart sounds.   Pulmonary:      Effort: Pulmonary effort is normal.      Breath sounds: Normal breath sounds.   Abdominal:      General: Bowel sounds are normal.      Palpations: Abdomen is soft.   Musculoskeletal:         " General: Normal range of motion.      Cervical back: Full passive range of motion without pain and normal range of motion.   Skin:     General: Skin is warm and moist.   Neurological:      General: No focal deficit present.      Mental Status: She is alert and oriented to person, place, and time. Mental status is at baseline.   Psychiatric:         Attention and Perception: Attention normal.         Mood and Affect: Mood normal.         Speech: Speech normal.         Behavior: Behavior is cooperative.         Thought Content: Thought content normal.         Cognition and Memory: Cognition normal.         Judgment: Judgment normal.       Current Outpatient Medications   Medication Instructions    ascorbic acid (VITAMIN C) 1,000 mg, Daily    Ca-D3-mag-zinc--sanjuanita-boron (Caltrate 600-D Plus Minerals) 600 mg calcium- 800 unit-40 mg tablet,chewable 1 tablet, Daily    Dexilant 60 mg, oral, Daily    docusate sodium (Colace) 100 mg capsule 1 capsule, Daily    estradiol (ESTRACE) 1 mg, Daily    FLUoxetine (PROZAC) 10 mg, oral, Daily    ondansetron ODT (ZOFRAN-ODT) 4 mg, 3 times daily      Lab Results   Component Value Date    WBC 8.9 12/21/2024    HGB 13.0 12/21/2024    HCT 40.2 12/21/2024     12/21/2024    CHOL 197 07/09/2024    TRIG 115 07/09/2024    HDL 64.6 07/09/2024    ALT 15 12/21/2024    AST 17 12/21/2024     12/21/2024    K 4.3 12/21/2024     12/21/2024    CREATININE 0.83 12/21/2024    BUN 19 12/21/2024    CO2 28 12/21/2024    TSH 1.53 07/09/2024    HGBA1C 5.3 07/09/2024       Problem List Items Addressed This Visit             ICD-10-CM    Other fatigue R53.83    Relevant Orders    CBC    Comprehensive Metabolic Panel    Lipid Panel    TSH with reflex to Free T4 if abnormal    Vitamin B12    Vitamin D 1,25 Dihydroxy (for eval of hypercalcemia)    Obesity E66.9     -prescription sent to [facility] pharmacy for the semaglutide  -continue watch diet and increase exercise         Relevant  "Orders    CBC    Comprehensive Metabolic Panel    Lipid Panel    TSH with reflex to Free T4 if abnormal    Vitamin B12    Vitamin D 1,25 Dihydroxy (for eval of hypercalcemia)    Depression with anxiety F41.8     -unable to tolerate Lexapro states it made her feel \"loopy\"  -doing well on the Prozac, denies any adverse effects  -condition stable at this time         Lipid screening Z13.220    Relevant Orders    Lipid Panel    Routine general medical examination at a health care facility Z00.00     -follow up in 6 months or sooner if needed  -complete annual labs prior to next visit         Relevant Orders    CBC    Comprehensive Metabolic Panel    Lipid Panel    TSH with reflex to Free T4 if abnormal    Vitamin B12    Vitamin D 1,25 Dihydroxy (for eval of hypercalcemia)     Other Visit Diagnoses         Codes    Mixed hyperlipidemia     E78.2    Relevant Orders    CBC    Comprehensive Metabolic Panel    Lipid Panel    TSH with reflex to Free T4 if abnormal    Vitamin B12    Vitamin D 1,25 Dihydroxy (for eval of hypercalcemia)            --------------------  Written by Jailene Davison LPN, acting as a scribe for Dr. Archer. This note accurately reflects the work and decisions made by Dr. Archer.     I, Dr. Archer, attest all medical record entries made by the scribe were under my direction and were personally dictated by me. I have reviewed the chart and agree that the record accurately reflects my performance of the history, physical exam, and assessment and plan.     "

## 2025-03-05 NOTE — PATIENT INSTRUCTIONS
It was nice to see you in the office today!  As discussed during our visit...       Continue the same medications.  Your chronic conditions appear stable.  Call the office with any questions or concerns [phone]  Follow up in 6 months or sooner if needed.    Even though medications like Wegovy (semaglutide) and Zepbound (tirzepatide) demonstrate remarkable effectiveness for weight loss, frustration exists on accessing such Food and Drug Administration (FDA)-approved treatments due to medication shortages, lack of insurance coverage and high out-of-pocket costs. Such barriers can result in patients looking for available alternatives, such as compounded versions.     Please be aware that compounded versions of semaglutide and tirzepatide may not be what they are advertised to be. These compounded versions are not the same as the drug provided by the manufacturers, they are considered “generics” or have been modified in some way for legal distribution.     The FDA stated very clearly that, “Patients should be aware that some products sold as 'semaglutide' may not contain the same active ingredient as FDA-approved semaglutide products and may be the salt formulations.”     We will be sending your prescription to [facility] in [place], [place]. They started in 1999 and have received recognition as a PCAB accredited pharmacy in both sterile and non-sterile compounding. The American Medical Association recommends only using BCAB accredited pharmacies, and only 1% of pharmacies meet that standard.     Once they receive your RX, they should be reaching out to you for payment and shipping information.   Please let us know how we can assist in administration guidance.      Please have your 12 hour FASTING blood drawn on or after July 29, 2025.    You do NOT need an appointment for lab work and/or Xray's but now that [facility] was bought out by [employer] [facility], an appointment for lab work is RECOMMENDED /  encouraged.    Do not get lab work done while you are on steroids (prednisone, medrol dose pack, dexamethasone) unless instructed differently by Dr Archer because this can cause some labs to be off.  Hold Biotin, B vitamins, B Complex for 2-3 days before any blood draw (this can cause false thyroid function tests)  During the 12 hour FASTING time, you may have BLACK coffee, BLACK tea and/or water at any time.    The two nearest Outpatient Lab's to THIS office is:  [facility] (this is IN the Urgent Care building by [employer])  [address]  [place] [address] [phone]    Link to Schedule an Appointment:  [email or web address]    Hours:   Monday through Friday 7:30am - 4:30pm  CLOSED Saturday and Sunday    Or you can go to ...  [facility]  [address]  [place], [place] [address] [phone]    Link to Schedule an Appointment:  [email or web address]    Hours:   Monday through Friday 7am - 12:30pm, 1pm - 4:30pm  CLOSED Saturday and Sunday     Click the blue link to take you to the website to confirm hours/location Location Search for Labwork  [email or web address]    We will contact you with the results of your blood work (once they have ALL finalized & Dr Archer has reviewed them) and let you know of any necessary adjustments need to be done to your plan of care.    If you do not hear from us within 3-5 days business days of having your blood drawn, please call the [place] office at [phone].   -----------  The  [employer] offers affordable laboratory tests.   Financial assistance is also available to those who meet financial eligibility requirements by submitting an HCAP Application or calling, [phone].   Click this link to  Get a Price Estimate Today on what lab work may cost you.

## 2025-03-06 NOTE — ASSESSMENT & PLAN NOTE
-prescription sent to [facility] pharmacy for the semaglutide  -continue watch diet and increase exercise

## 2025-03-06 NOTE — ASSESSMENT & PLAN NOTE
"-unable to tolerate Lexapro states it made her feel \"loopy\"  -doing well on the Prozac, denies any adverse effects  -condition stable at this time  "

## 2025-04-07 DIAGNOSIS — F41.8 DEPRESSION WITH ANXIETY: ICD-10-CM

## 2025-04-07 RX ORDER — FLUOXETINE 10 MG/1
10 CAPSULE ORAL DAILY
Qty: 90 CAPSULE | Refills: 0 | Status: SHIPPED | OUTPATIENT
Start: 2025-04-07

## 2025-04-17 PROCEDURE — 88175 CYTOPATH C/V AUTO FLUID REDO: CPT

## 2025-04-18 ENCOUNTER — LAB REQUISITION (OUTPATIENT)
Dept: LAB | Facility: HOSPITAL | Age: 55
End: 2025-04-18
Payer: OTHER GOVERNMENT

## 2025-04-18 DIAGNOSIS — Z11.51 ENCOUNTER FOR SCREENING FOR HUMAN PAPILLOMAVIRUS (HPV): ICD-10-CM

## 2025-04-18 DIAGNOSIS — Z01.419 ENCOUNTER FOR GYNECOLOGICAL EXAMINATION (GENERAL) (ROUTINE) WITHOUT ABNORMAL FINDINGS: ICD-10-CM

## 2025-04-18 DIAGNOSIS — Z12.4 ENCOUNTER FOR SCREENING FOR MALIGNANT NEOPLASM OF CERVIX: ICD-10-CM

## 2025-05-05 LAB
CYTOLOGY CMNT CVX/VAG CYTO-IMP: NORMAL
LAB AP HPV GENOTYPE QUESTION: YES
LAB AP HPV HR: NORMAL
LABORATORY COMMENT REPORT: NORMAL
PATH REPORT.TOTAL CANCER: NORMAL

## 2025-07-26 DIAGNOSIS — K21.9 GASTROESOPHAGEAL REFLUX DISEASE WITHOUT ESOPHAGITIS: ICD-10-CM

## 2025-07-28 DIAGNOSIS — K21.9 GASTROESOPHAGEAL REFLUX DISEASE WITHOUT ESOPHAGITIS: ICD-10-CM

## 2025-07-28 RX ORDER — DEXLANSOPRAZOLE 60 MG/1
1 CAPSULE, DELAYED RELEASE ORAL
Qty: 90 CAPSULE | Refills: 0 | Status: SHIPPED | OUTPATIENT
Start: 2025-07-28

## 2025-07-28 RX ORDER — DEXLANSOPRAZOLE 60 MG/1
1 CAPSULE, DELAYED RELEASE ORAL
Qty: 90 CAPSULE | Refills: 0 | Status: SHIPPED | OUTPATIENT
Start: 2025-07-28 | End: 2025-07-28 | Stop reason: SDUPTHER

## 2025-07-29 RX ORDER — ESOMEPRAZOLE MAGNESIUM 40 MG/1
40 CAPSULE, DELAYED RELEASE ORAL
Qty: 90 CAPSULE | Refills: 0 | Status: SHIPPED | OUTPATIENT
Start: 2025-07-29 | End: 2025-10-27

## 2025-08-12 ENCOUNTER — APPOINTMENT (OUTPATIENT)
Dept: PRIMARY CARE | Facility: CLINIC | Age: 55
End: 2025-08-12
Payer: OTHER GOVERNMENT

## 2025-08-12 VITALS
WEIGHT: 192 LBS | BODY MASS INDEX: 34.02 KG/M2 | RESPIRATION RATE: 18 BRPM | DIASTOLIC BLOOD PRESSURE: 70 MMHG | OXYGEN SATURATION: 98 % | SYSTOLIC BLOOD PRESSURE: 108 MMHG | HEART RATE: 56 BPM | HEIGHT: 63 IN

## 2025-08-12 DIAGNOSIS — M85.852 OSTEOPENIA OF LEFT HIP: ICD-10-CM

## 2025-08-12 DIAGNOSIS — Z00.00 ENCOUNTER FOR ANNUAL PHYSICAL EXAM: ICD-10-CM

## 2025-08-12 DIAGNOSIS — F41.9 ANXIETY: ICD-10-CM

## 2025-08-12 DIAGNOSIS — Z13.220 LIPID SCREENING: ICD-10-CM

## 2025-08-12 DIAGNOSIS — N95.9 UNSPECIFIED MENOPAUSAL AND PERIMENOPAUSAL DISORDER: ICD-10-CM

## 2025-08-12 DIAGNOSIS — K21.9 GASTROESOPHAGEAL REFLUX DISEASE WITHOUT ESOPHAGITIS: ICD-10-CM

## 2025-08-12 PROCEDURE — 3008F BODY MASS INDEX DOCD: CPT | Performed by: INTERNAL MEDICINE

## 2025-08-12 PROCEDURE — 99396 PREV VISIT EST AGE 40-64: CPT | Performed by: INTERNAL MEDICINE

## 2025-08-12 PROCEDURE — 1036F TOBACCO NON-USER: CPT | Performed by: INTERNAL MEDICINE

## 2025-08-12 RX ORDER — ESOMEPRAZOLE MAGNESIUM 40 MG/1
40 CAPSULE, DELAYED RELEASE ORAL 2 TIMES DAILY
Qty: 180 CAPSULE | Refills: 0 | Status: SHIPPED | OUTPATIENT
Start: 2025-08-12

## 2025-08-12 RX ORDER — FLUOXETINE 20 MG/1
20 CAPSULE ORAL DAILY
Qty: 90 CAPSULE | Refills: 1 | Status: SHIPPED | OUTPATIENT
Start: 2025-08-12

## 2025-08-12 RX ORDER — FLUOROURACIL 50 MG/G
CREAM TOPICAL
COMMUNITY
Start: 2025-05-03

## 2025-08-12 ASSESSMENT — ENCOUNTER SYMPTOMS
RESPIRATORY NEGATIVE: 1
GASTROINTESTINAL NEGATIVE: 1
MUSCULOSKELETAL NEGATIVE: 1
ENDOCRINE NEGATIVE: 1
EYES NEGATIVE: 1
NEUROLOGICAL NEGATIVE: 1
NERVOUS/ANXIOUS: 1
CARDIOVASCULAR NEGATIVE: 1
CONSTITUTIONAL NEGATIVE: 1
ROS GI COMMENTS: ACID REFLUX

## 2025-08-12 ASSESSMENT — PAIN SCALES - GENERAL: PAINLEVEL_OUTOF10: 6

## 2025-08-13 LAB
ALBUMIN SERPL-MCNC: 4.2 G/DL (ref 3.6–5.1)
ALP SERPL-CCNC: 64 U/L (ref 37–153)
ALT SERPL-CCNC: 13 U/L (ref 6–29)
ANION GAP SERPL CALCULATED.4IONS-SCNC: 7 MMOL/L (CALC) (ref 7–17)
AST SERPL-CCNC: 16 U/L (ref 10–35)
BASOPHILS # BLD AUTO: 39 CELLS/UL (ref 0–200)
BASOPHILS NFR BLD AUTO: 0.7 %
BILIRUB SERPL-MCNC: 0.8 MG/DL (ref 0.2–1.2)
BUN SERPL-MCNC: 17 MG/DL (ref 7–25)
CALCIUM SERPL-MCNC: 9.3 MG/DL (ref 8.6–10.4)
CHLORIDE SERPL-SCNC: 104 MMOL/L (ref 98–110)
CHOLEST SERPL-MCNC: 211 MG/DL
CHOLEST/HDLC SERPL: 3.2 (CALC)
CO2 SERPL-SCNC: 30 MMOL/L (ref 20–32)
CREAT SERPL-MCNC: 0.72 MG/DL (ref 0.5–1.03)
EGFRCR SERPLBLD CKD-EPI 2021: 97 ML/MIN/1.73M2
EOSINOPHIL # BLD AUTO: 149 CELLS/UL (ref 15–500)
EOSINOPHIL NFR BLD AUTO: 2.7 %
ERYTHROCYTE [DISTWIDTH] IN BLOOD BY AUTOMATED COUNT: 14.1 % (ref 11–15)
GLUCOSE SERPL-MCNC: 95 MG/DL (ref 65–99)
HCT VFR BLD AUTO: 42.8 % (ref 35–45)
HDLC SERPL-MCNC: 66 MG/DL
HGB BLD-MCNC: 13.6 G/DL (ref 11.7–15.5)
LDLC SERPL CALC-MCNC: 123 MG/DL (CALC)
LYMPHOCYTES # BLD AUTO: 1854 CELLS/UL (ref 850–3900)
LYMPHOCYTES NFR BLD AUTO: 33.7 %
MCH RBC QN AUTO: 30.2 PG (ref 27–33)
MCHC RBC AUTO-ENTMCNC: 31.8 G/DL (ref 32–36)
MCV RBC AUTO: 94.9 FL (ref 80–100)
MONOCYTES # BLD AUTO: 440 CELLS/UL (ref 200–950)
MONOCYTES NFR BLD AUTO: 8 %
NEUTROPHILS # BLD AUTO: 3020 CELLS/UL (ref 1500–7800)
NEUTROPHILS NFR BLD AUTO: 54.9 %
NONHDLC SERPL-MCNC: 145 MG/DL (CALC)
PLATELET # BLD AUTO: 322 THOUSAND/UL (ref 140–400)
PMV BLD REES-ECKER: 10.3 FL (ref 7.5–12.5)
POTASSIUM SERPL-SCNC: 4.4 MMOL/L (ref 3.5–5.3)
PROT SERPL-MCNC: 6.7 G/DL (ref 6.1–8.1)
RBC # BLD AUTO: 4.51 MILLION/UL (ref 3.8–5.1)
SODIUM SERPL-SCNC: 141 MMOL/L (ref 135–146)
TRIGL SERPL-MCNC: 117 MG/DL
TSH SERPL-ACNC: 1.71 MIU/L (ref 0.4–4.5)
WBC # BLD AUTO: 5.5 THOUSAND/UL (ref 3.8–10.8)

## 2025-08-19 ENCOUNTER — TELEPHONE (OUTPATIENT)
Dept: PRIMARY CARE | Facility: CLINIC | Age: 55
End: 2025-08-19
Payer: OTHER GOVERNMENT

## 2025-08-19 DIAGNOSIS — Z00.00 HEALTH MAINTENANCE EXAMINATION: ICD-10-CM

## 2025-08-19 DIAGNOSIS — K21.9 GASTROESOPHAGEAL REFLUX DISEASE WITHOUT ESOPHAGITIS: Primary | ICD-10-CM

## 2025-08-20 RX ORDER — LANSOPRAZOLE 30 MG/1
30 CAPSULE, DELAYED RELEASE ORAL DAILY
Qty: 30 CAPSULE | Refills: 1 | Status: SHIPPED | OUTPATIENT
Start: 2025-08-20 | End: 2025-10-19

## 2025-08-21 DIAGNOSIS — F41.9 ANXIETY: ICD-10-CM

## 2025-08-21 RX ORDER — FLUOXETINE 10 MG/1
10 CAPSULE ORAL DAILY
Qty: 90 CAPSULE | Refills: 0 | Status: SHIPPED | OUTPATIENT
Start: 2025-08-21

## 2025-08-27 ENCOUNTER — TELEPHONE (OUTPATIENT)
Dept: PRIMARY CARE | Facility: CLINIC | Age: 55
End: 2025-08-27
Payer: OTHER GOVERNMENT

## 2025-08-27 DIAGNOSIS — N39.0 URINARY TRACT INFECTION WITHOUT HEMATURIA, SITE UNSPECIFIED: Primary | ICD-10-CM

## 2025-08-27 RX ORDER — CIPROFLOXACIN 250 MG/1
250 TABLET, FILM COATED ORAL 2 TIMES DAILY
Qty: 14 TABLET | Refills: 0 | Status: SHIPPED | OUTPATIENT
Start: 2025-08-27 | End: 2025-09-03

## 2025-12-19 ENCOUNTER — APPOINTMENT (OUTPATIENT)
Dept: PRIMARY CARE | Facility: CLINIC | Age: 55
End: 2025-12-19
Payer: OTHER GOVERNMENT